# Patient Record
Sex: FEMALE | Race: OTHER | Employment: UNEMPLOYED | URBAN - METROPOLITAN AREA
[De-identification: names, ages, dates, MRNs, and addresses within clinical notes are randomized per-mention and may not be internally consistent; named-entity substitution may affect disease eponyms.]

---

## 2024-07-23 ENCOUNTER — OFFICE VISIT (OUTPATIENT)
Dept: OBGYN CLINIC | Facility: CLINIC | Age: 29
End: 2024-07-23

## 2024-07-23 ENCOUNTER — APPOINTMENT (OUTPATIENT)
Dept: LAB | Facility: CLINIC | Age: 29
End: 2024-07-23

## 2024-07-23 VITALS
DIASTOLIC BLOOD PRESSURE: 56 MMHG | SYSTOLIC BLOOD PRESSURE: 108 MMHG | BODY MASS INDEX: 24.27 KG/M2 | HEIGHT: 66 IN | WEIGHT: 151 LBS

## 2024-07-23 DIAGNOSIS — O36.80X0 PREGNANCY OF UNKNOWN ANATOMIC LOCATION: ICD-10-CM

## 2024-07-23 DIAGNOSIS — O20.9 BLEEDING IN EARLY PREGNANCY: ICD-10-CM

## 2024-07-23 DIAGNOSIS — N92.6 MISSED MENSES: ICD-10-CM

## 2024-07-23 DIAGNOSIS — R11.0 NAUSEA: ICD-10-CM

## 2024-07-23 DIAGNOSIS — O20.9 BLEEDING IN EARLY PREGNANCY: Primary | ICD-10-CM

## 2024-07-23 PROBLEM — E83.51 HYPOCALCEMIA: Status: ACTIVE | Noted: 2024-07-23

## 2024-07-23 PROBLEM — E55.9 VITAMIN D DEFICIENCY: Status: ACTIVE | Noted: 2024-07-23

## 2024-07-23 PROBLEM — D50.9 IRON DEFICIENCY ANEMIA: Status: ACTIVE | Noted: 2024-07-23

## 2024-07-23 PROBLEM — E28.2 PCOS (POLYCYSTIC OVARIAN SYNDROME): Status: ACTIVE | Noted: 2024-07-23

## 2024-07-23 LAB
ABO GROUP BLD: NORMAL
B-HCG SERPL-ACNC: ABNORMAL MIU/ML (ref 0–5)
BLD GP AB SCN SERPL QL: NEGATIVE
RH BLD: POSITIVE
SL AMB POCT URINE HCG: POSITIVE
SPECIMEN EXPIRATION DATE: NORMAL

## 2024-07-23 PROCEDURE — 86901 BLOOD TYPING SEROLOGIC RH(D): CPT

## 2024-07-23 PROCEDURE — 84702 CHORIONIC GONADOTROPIN TEST: CPT

## 2024-07-23 PROCEDURE — 86900 BLOOD TYPING SEROLOGIC ABO: CPT

## 2024-07-23 PROCEDURE — 99204 OFFICE O/P NEW MOD 45 MIN: CPT | Performed by: OBSTETRICS & GYNECOLOGY

## 2024-07-23 PROCEDURE — 36415 COLL VENOUS BLD VENIPUNCTURE: CPT

## 2024-07-23 PROCEDURE — 86850 RBC ANTIBODY SCREEN: CPT

## 2024-07-23 PROCEDURE — 81025 URINE PREGNANCY TEST: CPT | Performed by: OBSTETRICS & GYNECOLOGY

## 2024-07-23 NOTE — PROGRESS NOTES
Patient is a 29 y.o.  with Patient's last menstrual period was 2024 (exact date). who presents requesting evaluation of bleeding and pain in early pregnancy.   Pt reports history of PCOS, but her cycles are regular on Metformin. She reports she has been  a year, but only recently moved to the . She cycles every 28-32 days and has been open to pregnancy. She missed her menses in July and took a pregnancy test at home on  that was positive.   For the last 2 weeks she reports significant pelvic cramping. The first 2 days it was all day, and now it is only in the morning and then it eases up as the day progresses. She does report that she had brown fluid last week associated with the cramping. That has since resolved. She self reports a blood type of A+  Lastly, she reports nausea and dizziness in the morning for the last two days. She does not vomit and the symptoms resolve spontaneously.  She is not taking any medications other than vitamin D and Metformin.  Past Medical History:   Diagnosis Date    Chickenpox     Oral herpes     Varicella vaccination        Past Surgical History:   Procedure Laterality Date    NASAL RECONSTRUCTION      cosmetic       OB History    Para Term  AB Living   1 0 0 0 0 0   SAB IAB Ectopic Multiple Live Births   0 0 0 0 0      # Outcome Date GA Lbr Dante/2nd Weight Sex Type Anes PTL Lv   1 Current               Obstetric Comments   Menarche: [age]      Menses: 28-32/5-7/maxi pad  every 6 hours x 2 days, then regular pad every 8-12 hours         Current Outpatient Medications:     Cholecalciferol (VITAMIN D3) 1,000 units tablet, Take 1,000 Units by mouth daily, Disp: , Rfl:     metFORMIN (GLUCOPHAGE) 1000 MG tablet, Take 1,000 mg by mouth daily with breakfast, Disp: , Rfl:     No Known Allergies    Social History     Socioeconomic History    Marital status: /Civil Union     Spouse name: Alfredo    Number of children: 0    Years of education: None     Highest education level: Bachelor's degree (e.g., BA, AB, BS)   Occupational History    Occupation:    Tobacco Use    Smoking status: Former    Smokeless tobacco: Never    Tobacco comments:     Hookah   Vaping Use    Vaping status: Former   Substance and Sexual Activity    Alcohol use: Not Currently     Comment: stopped with + UPT    Drug use: Never    Sexual activity: Yes     Partners: Male     Birth control/protection: None     Comment: lifetime partners: 1   Other Topics Concern    None   Social History Narrative    Zoroastrian: [place] [employer]    Accepts blood products     Social Determinants of Health     Financial Resource Strain: Not on file   Food Insecurity: Not on file   Transportation Needs: Not on file   Physical Activity: Not on file   Stress: Not on file   Social Connections: Not on file   Intimate Partner Violence: Not on file   Housing Stability: Not on file       Family History   Problem Relation Age of Onset    Hypertension Mother     Hypertension Father     Lung cancer Father     No Known Problems Sister     No Known Problems Brother     No Known Problems Brother     No Known Problems Brother     Diabetes type II Maternal Grandmother     No Known Problems Maternal Grandfather     Hypertension Paternal Grandmother     Heart attack Paternal Grandmother     Hypertension Paternal Grandfather     Heart attack Paternal Grandfather     Breast cancer Neg Hx     Ovarian cancer Neg Hx     Colon cancer Neg Hx        Review of Systems   Constitutional:  Negative for chills, fatigue, fever and unexpected weight change.   HENT:  Negative for congestion, mouth sores and sore throat.    Respiratory:  Negative for cough, chest tightness, shortness of breath and wheezing.    Cardiovascular:  Negative for chest pain and palpitations.   Gastrointestinal:  Positive for nausea. Negative for abdominal distention, abdominal pain, constipation, diarrhea and vomiting.   Endocrine: Negative for cold intolerance and  "heat intolerance.   Genitourinary:  Positive for pelvic pain and vaginal bleeding. Negative for dyspareunia, dysuria, genital sores, menstrual problem, vaginal discharge and vaginal pain.   Musculoskeletal:  Negative for arthralgias.   Skin:  Negative for color change and rash.   Neurological:  Negative for dizziness, light-headedness and headaches.   Hematological:  Negative for adenopathy.       Blood pressure 108/56, height 5' 6\" (1.676 m), weight 68.5 kg (151 lb), last menstrual period 06/08/2024. and Body mass index is 24.37 kg/m².    Physical Exam  Constitutional:       General: She is not in acute distress.     Appearance: Normal appearance. She is well-developed and normal weight. She is not ill-appearing.   HENT:      Head: Normocephalic and atraumatic.   Eyes:      Extraocular Movements: Extraocular movements intact.      Conjunctiva/sclera: Conjunctivae normal.   Neck:      Thyroid: No thyromegaly.      Trachea: No tracheal deviation.   Cardiovascular:      Rate and Rhythm: Normal rate and regular rhythm.      Heart sounds: Normal heart sounds.   Pulmonary:      Effort: Pulmonary effort is normal. No respiratory distress.      Breath sounds: Normal breath sounds. No stridor. No wheezing or rales.   Abdominal:      General: Abdomen is flat. Bowel sounds are normal. There is no distension.      Palpations: Abdomen is soft. There is no mass.      Tenderness: There is no abdominal tenderness. There is no guarding or rebound.      Hernia: No hernia is present.   Musculoskeletal:         General: No tenderness. Normal range of motion.      Cervical back: Normal range of motion and neck supple.   Lymphadenopathy:      Cervical: No cervical adenopathy.   Skin:     General: Skin is warm.      Findings: No erythema or rash.   Neurological:      Mental Status: She is alert and oriented to person, place, and time.   Psychiatric:         Mood and Affect: Mood normal.         Behavior: Behavior normal.         Thought " Content: Thought content normal.         Judgment: Judgment normal.          vulva: normal external genitalia for age and no lesions, masses, epithelial changes, or exudate  vagina: color pink, rugae  well formed rugae, and no bleeding noted  cervix: nullip and no lesions   uterus: NSSC, AF, NT, mobile  adnexa: no masses or tenderness      A/P:  Pt is a 29 y.o.  with      Yvette was seen today for new patient visit.    Diagnoses and all orders for this visit:    Bleeding in early pregnancy  -     hCG, quantitative; Future  -     Type and screen; Future  -     hCG, quantitative; Future    Pregnancy of unknown anatomic location  -     hCG, quantitative; Future  -     Type and screen; Future  -     hCG, quantitative; Future  -based on HCG trend will schedule dating u/s or r/o ectopic if needed    Nausea  -advised may try vamsi and vitamin b6 for relief    Missed menses  -     POCT urine HCG      Will contact patient with results  For now plan dating u/s and will schedule prior to leaving today

## 2024-07-25 ENCOUNTER — APPOINTMENT (OUTPATIENT)
Dept: LAB | Age: 29
End: 2024-07-25

## 2024-07-25 DIAGNOSIS — O20.9 HEMORRHAGE BEFORE 22 WEEKS GESTATION: ICD-10-CM

## 2024-07-25 DIAGNOSIS — Z87.59 ULTRASOUND SCAN TO CONFIRM FETAL VIABILITY WITH HISTORY OF MISCARRIAGE: ICD-10-CM

## 2024-07-25 DIAGNOSIS — O36.80X0 ULTRASOUND SCAN TO CONFIRM FETAL VIABILITY WITH HISTORY OF MISCARRIAGE: ICD-10-CM

## 2024-07-25 LAB — B-HCG SERPL-ACNC: ABNORMAL MIU/ML (ref 0–5)

## 2024-07-25 PROCEDURE — 84702 CHORIONIC GONADOTROPIN TEST: CPT

## 2024-07-25 PROCEDURE — 36415 COLL VENOUS BLD VENIPUNCTURE: CPT

## 2024-07-26 DIAGNOSIS — O36.80X0 PREGNANCY OF UNKNOWN ANATOMIC LOCATION: ICD-10-CM

## 2024-07-26 DIAGNOSIS — O20.9 BLEEDING IN EARLY PREGNANCY: Primary | ICD-10-CM

## 2024-08-01 ENCOUNTER — APPOINTMENT (OUTPATIENT)
Dept: LAB | Age: 29
End: 2024-08-01
Payer: COMMERCIAL

## 2024-08-01 DIAGNOSIS — O20.9 BLEEDING IN EARLY PREGNANCY: ICD-10-CM

## 2024-08-01 DIAGNOSIS — O36.80X0 PREGNANCY OF UNKNOWN ANATOMIC LOCATION: ICD-10-CM

## 2024-08-01 LAB — B-HCG SERPL-ACNC: ABNORMAL MIU/ML (ref 0–5)

## 2024-08-01 PROCEDURE — 84702 CHORIONIC GONADOTROPIN TEST: CPT

## 2024-08-01 PROCEDURE — 36415 COLL VENOUS BLD VENIPUNCTURE: CPT

## 2024-08-07 ENCOUNTER — ULTRASOUND (OUTPATIENT)
Dept: OBGYN CLINIC | Facility: CLINIC | Age: 29
End: 2024-08-07
Payer: COMMERCIAL

## 2024-08-07 VITALS
BODY MASS INDEX: 24.98 KG/M2 | SYSTOLIC BLOOD PRESSURE: 110 MMHG | DIASTOLIC BLOOD PRESSURE: 62 MMHG | WEIGHT: 155.4 LBS | HEIGHT: 66 IN

## 2024-08-07 DIAGNOSIS — Z36.9 ANTENATAL SCREENING ENCOUNTER: ICD-10-CM

## 2024-08-07 DIAGNOSIS — O36.80X0 ENCOUNTER TO DETERMINE FETAL VIABILITY OF PREGNANCY, SINGLE OR UNSPECIFIED FETUS: Primary | ICD-10-CM

## 2024-08-07 PROCEDURE — 76817 TRANSVAGINAL US OBSTETRIC: CPT | Performed by: OBSTETRICS & GYNECOLOGY

## 2024-08-07 NOTE — PROGRESS NOTES
Procedures    Serial transvaginal images were obtained through the gravid maternal uterus. The patient's LMP was 6/8/2024 with an SHWETHA of  3/15/2025 and a gestational age of 8w4d (based on LMP).   The indication for today's examination is to confirm fetal size and viability.    CRL=  2.40cm    9w1d    SHWETHA of 3/11/2025   YS=  3.3mm  FHR=  175bpm    The uterus and bilateral ovaries appear within normal limits. Left ovary is only partially seen due to patient discomfort during scan.     Uterus= 13.27 x 6.80 x 10.04cm  Rt Ovary= 4.11 x 2.22 x 2.60cm  Lt. Ovary= 3.29 x 2.04 x 2.06cm    Impression: Single, viable IUP.     Kyara Scherer RDMS    GE Z1No-RT transvaginal transducer Serial # [id number] was used to perform the examination today and subsequently followed with high level disinfection utilizing Trophon EPR procedure.    Ultrasound performed at:     [facility] [address]  Phone:  [phone]  Fax:  [phone]

## 2024-08-08 ENCOUNTER — TELEPHONE (OUTPATIENT)
Age: 29
End: 2024-08-08

## 2024-08-13 ENCOUNTER — INITIAL PRENATAL (OUTPATIENT)
Dept: OBGYN CLINIC | Facility: CLINIC | Age: 29
End: 2024-08-13

## 2024-08-13 ENCOUNTER — TELEPHONE (OUTPATIENT)
Age: 29
End: 2024-08-13

## 2024-08-13 VITALS
BODY MASS INDEX: 25.68 KG/M2 | HEIGHT: 66 IN | WEIGHT: 159.8 LBS | DIASTOLIC BLOOD PRESSURE: 64 MMHG | SYSTOLIC BLOOD PRESSURE: 100 MMHG

## 2024-08-13 DIAGNOSIS — Z34.01 ENCOUNTER FOR SUPERVISION OF NORMAL FIRST PREGNANCY IN FIRST TRIMESTER: Primary | ICD-10-CM

## 2024-08-13 DIAGNOSIS — E28.2 PCOS (POLYCYSTIC OVARIAN SYNDROME): ICD-10-CM

## 2024-08-13 DIAGNOSIS — Z13.71 SCREENING FOR GENETIC DISEASE CARRIER STATUS: ICD-10-CM

## 2024-08-13 PROCEDURE — NOBC

## 2024-08-13 NOTE — PROGRESS NOTES
OB INTAKE INTERVIEW 2024    Tajik interpretor [id number] used for visit.     Patient is 29 y.o. who presents for OB intake at 9w3d.  She is accompanied by her spouse during this encounter.  The father of her baby (Alfredo Sanchez) is involved in the pregnancy and he is 32 years old.      Patient's last menstrual period was 2024 (exact date).  Ultrasound: Measured 9 weeks 0 days on 2024  Estimated Date of Delivery: 3/15/25 confirmed by dating ultrasound.    Signs/Symptoms of Pregnancy  Current pregnancy symptoms: frequent urination  Headaches no  Cramping no  Spotting no  PICA cravings no    Diabetes-  Body mass index is 25.79 kg/m².  If patient has 1 or more, please order early 1 hour GTT  History of GDM no  BMI >35 no  History of PCOS or current metformin use YES  History of LGA/macrosomic infant (4000g/9lbs) no    If patient has 2 or more, please order early 1 hour GTT  BMI>30 no  AMA no  First degree relative with type 2 diabetes no  History of chronic HTN, hyperlipidemia, elevated A1C no  High risk race (, , ,  or ) no    Hypertension- if you answer yes to any of the following, please order baseline preeclampsia labs (cbc, comprehensive metabolic panel, urine protein creatinine ratio, uric acid)  History of of chronic HTN no  History of gestational HTN no  History of preeclampsia, eclampsia, or HELLP syndrome no  History of diabetes no  History of lupus, autoimmune disease, kidney disease no    Thyroid- if yes order TSH with reflex T4  History of thyroid disease no    Bleeding Disorder or Hx of DVT-patient or first degree relative with history of. Order the following if not done previously.   (Factor V, antithrombin III, prothrombin gene mutation, protein C and S Ag, lupus anticoagulant, anticardiolipin, beta-2 glycoprotein)   no    OB/GYN-  History of abnormal pap smear no-patient states she has not ever had a pap smear       Date of  last pap smear Not on file  History of HPV no  History of Herpes/HSV no  History of other STI (gonorrhea, chlamydia, trich) no  History of prior  no  History of prior  no  History of  delivery prior to 36 weeks 6 days no  History of blood transfusion no  Ok for blood transfusion  YES    Substance screening-   History of tobacco use YES  Currently using tobacco no  Substance Use Screen Level:  Low risk    MRSA Screening-   Does the pt have a hx of MRSA? no    Immunizations:  Discussed Tdap vaccine:  YES  Discussed COVID Vaccine:  YES    Genetic/MFM-  Do you or your partner have a history of any of the following in yourselves or first degree relatives?  Cystic fibrosis no  Spinal muscular atrophy no  Hemoglobinopathy/Sickle Cell/Thalassemia no  Fragile X Intellectual Disability no    If yes, discuss Carrier Screening and recommend consultation with MFM/Genetic Counseling and place specific [facility] Referral for.    If no, discuss Carrier Screening being completed once in a lifetime as a standard of care lab test. Place orders for Cystic Fibrosis Gene Test (IGZ368) and Spinal Muscular Atrophy DNA ([id number]).  Patient was informed that prior authorization needs to be completed for these tests and this may take 7-10 business days.  Patient does not desire testing for Cystic Fibrosis and Spinal Muscular Atrophy.    Appointment for Nuchal Translucency Ultrasound at [facility] has not been scheduled.  Patient will call to schedule appointment.     Interview education  [facility] Pregnancy Essentials Book reviewed, discussed and attached to their AVS:  YES    Nurse/Family Partnership-patient may qualify NO; referral placed No     Prenatal lab work scripts YES    Extra labs ordered: Hgb Fractionation Cascade and 1 hour GTT    Aspirin/Preeclampsia Screen    Risk Level Risk Factor Recommendation   LOW Prior Uncomplicated full-term delivery no No Aspirin recommendation        MODERATE Nulliparity YES Recommend low-dose  aspirin if     BMI>30 no 2 or more moderate risk factors    Family History Preeclampsia (mother/sister) no     35yr old or greater no      or Low Socioeconomic no     IVF Pregnancy  no     Personal History Risks (low birth weight, prior adverse preg outcome, >10yr preg interval) no         HIGH History of Preeclampsia no Recommend low-dose aspirin if     Multifetal gestation no 1 or more high risk factors    Chronic HTN no     Type 1 or 2 Diabetes no     Renal Disease no     Autoimmune Disease  no      Contraindications to ASA therapy:  NSAID/ ASA allergy: no  Nasal polyps: no  Asthma with history of ASA induced bronchospasm: no  Relative contraindications:  History of GI bleed: no  Active peptic ulcer disease: no  Severe hepatic dysfunction: no    Patient does not meet recommendation to take ASA 162mg during this pregnancy from 12-36wks to lower her risk of preeclampsia.      Mental Health:  History of depression: no  History of anxiety: no  EPDS Screen:  Negative   Score:  0    Dental Exam:  Last dental exam within the past 6 months: Yes    The patient has a history now or in prior pregnancy notable for: No current or previous pregnancy complications.    Details that I feel the provider should be aware of: No additional concerns    PN1 visit scheduled. The patient was oriented to our practice and the navigator role.  Reviewed delivering physicians and [facility] for delivery. All questions were answered.    Interviewed by: PETER DELANEY LPN

## 2024-08-13 NOTE — PATIENT INSTRUCTIONS
Congratulations on your pregnancy!  We thank you for allowing us to participate in your care.    NEXT STEPS    Go to the lab to have your prenatal blood work competed, if you have not already done so.  We ask that you have this blood work done prior to your first routine prenatal appointment.  There is a listing of [facility] and locations in your prenatal folder. You may also visit [email or web address] or call [phone].   Please be aware that some insurance companies may require you to go to a specific lab (ex. [facility] or [facility]). You can verify this by contacting your insurance company.   If you have decided to have genetic testing done at Maternal Fetal Medicine, that will be scheduled by [facility]. You may have already scheduled this appointment.  If not, please call their office to schedule this appointment.  Based on the referral placed by our office, they will know how to schedule you appropriately.    Contact information for M[facility] Medicine is located in your prenatal folder. The main phone number to their office is [phone]..   Return to our office for your first routine prenatal visit.   Some offices have multiple locations. Always check the address of your appointment to ensure you are going to the correct office.   If you experience any problems or concerns, call the office directly.  Remember to only use mPortal for none urgent concerns or questions.  Our doctors deliver at [facility] in [place]. The address is provided below.     [place] [id number]    Click on the links below to review our Pregnancy Essential Guide.  [facility] Pregnancy Essentials Guide  [facility] ([email or web address])     Click on the link below to review [facility] locations.  [facility] Lab Locations    [employer] resource  Only-apartments is a tool to connect you to community resources you may need.    Thank you,  Keely MAYS LPN  OB Nurse  Navigator

## 2024-08-14 ENCOUNTER — TELEPHONE (OUTPATIENT)
Age: 29
End: 2024-08-14

## 2024-08-14 NOTE — TELEPHONE ENCOUNTER
Called patient 3x to schedule Nuchal, voicemail not set up. [employer] message sent. Notifying OB. Thank you.

## 2024-08-21 ENCOUNTER — APPOINTMENT (OUTPATIENT)
Dept: LAB | Facility: HOSPITAL | Age: 29
End: 2024-08-21
Payer: COMMERCIAL

## 2024-08-21 DIAGNOSIS — Z34.01 ENCOUNTER FOR SUPERVISION OF NORMAL FIRST PREGNANCY IN FIRST TRIMESTER: ICD-10-CM

## 2024-08-21 DIAGNOSIS — E28.2 PCOS (POLYCYSTIC OVARIAN SYNDROME): ICD-10-CM

## 2024-08-21 LAB
ABO GROUP BLD: NORMAL
BASOPHILS # BLD AUTO: 0.02 THOUSANDS/ÂΜL (ref 0–0.1)
BASOPHILS NFR BLD AUTO: 0 % (ref 0–1)
BLD GP AB SCN SERPL QL: NEGATIVE
EOSINOPHIL # BLD AUTO: 0.06 THOUSAND/ÂΜL (ref 0–0.61)
EOSINOPHIL NFR BLD AUTO: 1 % (ref 0–6)
ERYTHROCYTE [DISTWIDTH] IN BLOOD BY AUTOMATED COUNT: 13.8 % (ref 11.6–15.1)
GLUCOSE 1H P 50 G GLC PO SERPL-MCNC: 103 MG/DL (ref 70–134)
HCT VFR BLD AUTO: 33.3 % (ref 34.8–46.1)
HGB BLD-MCNC: 10.9 G/DL (ref 11.5–15.4)
IMM GRANULOCYTES # BLD AUTO: 0.04 THOUSAND/UL (ref 0–0.2)
IMM GRANULOCYTES NFR BLD AUTO: 1 % (ref 0–2)
LYMPHOCYTES # BLD AUTO: 1.31 THOUSANDS/ÂΜL (ref 0.6–4.47)
LYMPHOCYTES NFR BLD AUTO: 18 % (ref 14–44)
MCH RBC QN AUTO: 28.2 PG (ref 26.8–34.3)
MCHC RBC AUTO-ENTMCNC: 32.7 G/DL (ref 31.4–37.4)
MCV RBC AUTO: 86 FL (ref 82–98)
MONOCYTES # BLD AUTO: 0.46 THOUSAND/ÂΜL (ref 0.17–1.22)
MONOCYTES NFR BLD AUTO: 6 % (ref 4–12)
NEUTROPHILS # BLD AUTO: 5.45 THOUSANDS/ÂΜL (ref 1.85–7.62)
NEUTS SEG NFR BLD AUTO: 74 % (ref 43–75)
NRBC BLD AUTO-RTO: 0 /100 WBCS
PLATELET # BLD AUTO: 248 THOUSANDS/UL (ref 149–390)
PMV BLD AUTO: 12.1 FL (ref 8.9–12.7)
RBC # BLD AUTO: 3.87 MILLION/UL (ref 3.81–5.12)
RH BLD: POSITIVE
RUBV IGG SERPL IA-ACNC: 23.7 IU/ML
SPECIMEN EXPIRATION DATE: NORMAL
WBC # BLD AUTO: 7.34 THOUSAND/UL (ref 4.31–10.16)

## 2024-08-21 PROCEDURE — 86780 TREPONEMA PALLIDUM: CPT

## 2024-08-21 PROCEDURE — 82950 GLUCOSE TEST: CPT

## 2024-08-21 PROCEDURE — 83020 HEMOGLOBIN ELECTROPHORESIS: CPT

## 2024-08-21 PROCEDURE — 85025 COMPLETE CBC W/AUTO DIFF WBC: CPT

## 2024-08-21 PROCEDURE — 86850 RBC ANTIBODY SCREEN: CPT

## 2024-08-21 PROCEDURE — 86706 HEP B SURFACE ANTIBODY: CPT

## 2024-08-21 PROCEDURE — 87389 HIV-1 AG W/HIV-1&-2 AB AG IA: CPT

## 2024-08-21 PROCEDURE — 36415 COLL VENOUS BLD VENIPUNCTURE: CPT

## 2024-08-21 PROCEDURE — 86900 BLOOD TYPING SEROLOGIC ABO: CPT

## 2024-08-21 PROCEDURE — 86803 HEPATITIS C AB TEST: CPT

## 2024-08-21 PROCEDURE — 87340 HEPATITIS B SURFACE AG IA: CPT

## 2024-08-21 PROCEDURE — 86762 RUBELLA ANTIBODY: CPT

## 2024-08-21 PROCEDURE — 86901 BLOOD TYPING SEROLOGIC RH(D): CPT

## 2024-08-22 ENCOUNTER — APPOINTMENT (OUTPATIENT)
Dept: LAB | Age: 29
End: 2024-08-22
Payer: COMMERCIAL

## 2024-08-22 LAB
AMORPH URATE CRY URNS QL MICRO: ABNORMAL
BACTERIA UR QL AUTO: ABNORMAL /HPF
BILIRUB UR QL STRIP: NEGATIVE
CLARITY UR: ABNORMAL
COLOR UR: YELLOW
GLUCOSE UR STRIP-MCNC: NEGATIVE MG/DL
HBV SURFACE AB SER-ACNC: <3 MIU/ML
HBV SURFACE AG SER QL: NORMAL
HCV AB SER QL: NORMAL
HGB UR QL STRIP.AUTO: NEGATIVE
HIV 1+2 AB+HIV1 P24 AG SERPL QL IA: NORMAL
HIV 2 AB SERPL QL IA: NORMAL
HIV1 AB SERPL QL IA: NORMAL
HIV1 P24 AG SERPL QL IA: NORMAL
KETONES UR STRIP-MCNC: NEGATIVE MG/DL
LEUKOCYTE ESTERASE UR QL STRIP: NEGATIVE
MUCOUS THREADS UR QL AUTO: ABNORMAL
NITRITE UR QL STRIP: NEGATIVE
NON-SQ EPI CELLS URNS QL MICRO: ABNORMAL /HPF
PH UR STRIP.AUTO: 7 [PH]
PROT UR STRIP-MCNC: ABNORMAL MG/DL
RBC #/AREA URNS AUTO: ABNORMAL /HPF
SP GR UR STRIP.AUTO: 1.02 (ref 1–1.03)
TREPONEMA PALLIDUM IGG+IGM AB [PRESENCE] IN SERUM OR PLASMA BY IMMUNOASSAY: NORMAL
UROBILINOGEN UR STRIP-ACNC: <2 MG/DL
WBC #/AREA URNS AUTO: ABNORMAL /HPF

## 2024-08-22 PROCEDURE — 87186 SC STD MICRODIL/AGAR DIL: CPT

## 2024-08-22 PROCEDURE — 87077 CULTURE AEROBIC IDENTIFY: CPT

## 2024-08-22 PROCEDURE — 81001 URINALYSIS AUTO W/SCOPE: CPT

## 2024-08-22 PROCEDURE — 87086 URINE CULTURE/COLONY COUNT: CPT

## 2024-08-24 LAB
BACTERIA UR CULT: ABNORMAL
HGB A MFR BLD: 2.9 % (ref 1.8–3.2)
HGB A MFR BLD: 97.1 % (ref 96.4–98.8)
HGB F MFR BLD: 0 % (ref 0–2)
HGB FRACT BLD-IMP: NORMAL
HGB S MFR BLD: 0 %

## 2024-08-27 PROBLEM — Z01.84 LACK OF IMMUNITY TO HEPATITIS B VIRUS DEMONSTRATED BY SEROLOGIC TEST: Status: ACTIVE | Noted: 2024-08-27

## 2024-08-27 PROBLEM — O99.011 ANEMIA, ANTEPARTUM, FIRST TRIMESTER: Status: ACTIVE | Noted: 2024-08-27

## 2024-08-30 ENCOUNTER — INITIAL PRENATAL (OUTPATIENT)
Dept: OBGYN CLINIC | Facility: CLINIC | Age: 29
End: 2024-08-30

## 2024-08-30 VITALS
HEIGHT: 66 IN | HEART RATE: 81 BPM | DIASTOLIC BLOOD PRESSURE: 68 MMHG | OXYGEN SATURATION: 99 % | BODY MASS INDEX: 25.33 KG/M2 | WEIGHT: 157.6 LBS | SYSTOLIC BLOOD PRESSURE: 110 MMHG

## 2024-08-30 DIAGNOSIS — O09.91 PREGNANCY, HIGH-RISK, FIRST TRIMESTER: Primary | ICD-10-CM

## 2024-08-30 DIAGNOSIS — O99.011 ANEMIA, ANTEPARTUM, FIRST TRIMESTER: ICD-10-CM

## 2024-08-30 DIAGNOSIS — Z3A.11 11 WEEKS GESTATION OF PREGNANCY: ICD-10-CM

## 2024-08-30 PROCEDURE — 87591 N.GONORRHOEAE DNA AMP PROB: CPT | Performed by: OBSTETRICS & GYNECOLOGY

## 2024-08-30 PROCEDURE — G0145 SCR C/V CYTO,THINLAYER,RESCR: HCPCS | Performed by: OBSTETRICS & GYNECOLOGY

## 2024-08-30 PROCEDURE — PNV: Performed by: OBSTETRICS & GYNECOLOGY

## 2024-08-30 PROCEDURE — 87491 CHLMYD TRACH DNA AMP PROBE: CPT | Performed by: OBSTETRICS & GYNECOLOGY

## 2024-08-30 NOTE — PROGRESS NOTES
"Skysheet  #[id number] used for this encounter  Assessment & Plan  29 y.o.  at 11w6d presenting for routine prenatal visit.     Problem List       PCOS (polycystic ovarian syndrome)    Iron deficiency anemia    Vitamin D deficiency    Hypocalcemia    Anemia, antepartum, first trimester    Overview     Hgb 10.9 on initial prenatal labs         Lack of immunity to hepatitis B virus demonstrated by serologic test    11 weeks gestation of pregnancy    Overview     Hep B NI  Pap/GC [ ]  MSAFP [ ]  Flu [ ]  Tdap [ ]  Delivery consent [ ]  GBS [ ]  Contraception [ ]          Other Visit Diagnoses       Pregnancy, high-risk, first trimester    -  Primary          ____________________________________________________________  Subjective  She is without physical complaint.   She denies contractions, loss of fluid, or vaginal bleeding.   She reports feeling anxiety about this pregnancy; denies any anxiety medications prior to pregnancy; discussed possibility of using Atarax at night if needed, as well as [facility].    Objective  /68 (BP Location: Right arm, Patient Position: Sitting, Cuff Size: Standard)   Pulse 81   Ht 5' 6\" (1.676 m)   Wt 71.5 kg (157 lb 9.6 oz)   LMP 2024 (Exact Date)   SpO2 99%   BMI 25.44 kg/m²   FHR: 169 bpm    Physical Exam  Constitutional:       Appearance: She is well-developed.   Genitourinary:      Vulva normal.      Genitourinary Comments: Patient poorly tolerant of speculum exam      No vaginal discharge.   Cardiovascular:      Rate and Rhythm: Normal rate.   Pulmonary:      Effort: Pulmonary effort is normal. No respiratory distress.   Abdominal:      Palpations: Abdomen is soft.      Tenderness: There is no abdominal tenderness.   Skin:     General: Skin is warm and dry.          Patient's Active Problem List  Patient Active Problem List   Diagnosis    PCOS (polycystic ovarian syndrome)    Iron deficiency anemia    Vitamin D deficiency    Hypocalcemia    " Anemia, antepartum, first trimester    Lack of immunity to hepatitis B virus demonstrated by serologic test    11 weeks gestation of pregnancy           Lakesha Betancourt MD  8/30/2024  8:16 AM

## 2024-09-03 LAB
C TRACH DNA SPEC QL NAA+PROBE: NEGATIVE
N GONORRHOEA DNA SPEC QL NAA+PROBE: NEGATIVE

## 2024-09-06 LAB
LAB AP GYN PRIMARY INTERPRETATION: NORMAL
LAB AP LMP: NORMAL
Lab: NORMAL

## 2024-09-10 ENCOUNTER — ROUTINE PRENATAL (OUTPATIENT)
Dept: PERINATAL CARE | Facility: CLINIC | Age: 29
End: 2024-09-10
Payer: COMMERCIAL

## 2024-09-10 VITALS
SYSTOLIC BLOOD PRESSURE: 108 MMHG | HEIGHT: 66 IN | BODY MASS INDEX: 25.13 KG/M2 | DIASTOLIC BLOOD PRESSURE: 64 MMHG | HEART RATE: 98 BPM | WEIGHT: 156.4 LBS

## 2024-09-10 DIAGNOSIS — Z3A.13 13 WEEKS GESTATION OF PREGNANCY: Primary | ICD-10-CM

## 2024-09-10 DIAGNOSIS — E55.9 VITAMIN D DEFICIENCY: ICD-10-CM

## 2024-09-10 DIAGNOSIS — Z36.9 ANTENATAL SCREENING ENCOUNTER: ICD-10-CM

## 2024-09-10 DIAGNOSIS — E28.2 PCOS (POLYCYSTIC OVARIAN SYNDROME): ICD-10-CM

## 2024-09-10 DIAGNOSIS — Z36.82 ENCOUNTER FOR (NT) NUCHAL TRANSLUCENCY SCAN: ICD-10-CM

## 2024-09-10 PROCEDURE — 76813 OB US NUCHAL MEAS 1 GEST: CPT | Performed by: OBSTETRICS & GYNECOLOGY

## 2024-09-10 PROCEDURE — 99243 OFF/OP CNSLTJ NEW/EST LOW 30: CPT | Performed by: OBSTETRICS & GYNECOLOGY

## 2024-09-10 NOTE — LETTER
"September 10, 2024     Cherelle Guaman MD  [address]  [address]  [place] [address]    Patient: Yvette Mcdonald   YOB: 1995   Date of Visit: 9/10/2024       Dear Dr. Guaman:    Thank you for referring Yvette Mcdonald to me for evaluation. Below are my notes for this consultation.    If you have questions, please do not hesitate to call me. I look forward to following your patient along with you.         Sincerely,        Vicki Moore MD        CC: No Recipients    Vicki Moore MD  9/10/2024  3:44 PM  Sign when Signing Visit  [facility]: Ms. Mcdonald was seen today at 13w3d for nuchal translucency ultrasound.  See ultrasound report under \"OB Procedures\" tab.      My recommendations are as follows:  We reviewed the availability of aneuploidy screening, as well as diagnostic testing, which are available to all pregnant women. We reviewed limitations, risks, and benefits of screening and testing.  She does not wish to pursue aneuploidy screening or diagnostic testing at this time. MSAFP screening should be ordered through your office at 15-20 weeks gestation, and completed prior to fetal anatomic survey.  A detailed anatomic survey as well as transvaginal cervical length screening are recommended around 20  weeks gestation.  Please offer carrier screening per ACOG guidelines.     Yvette inquired about whether to resume Vitamin D supplementation. I offered to check her Vitamin D levels to guide this decision-making. I did advise her that if she is found to have a deficiency I would recommend that she follow-up with her primary care provider for management.     Please don't hesitate to contact our office with any concerns or questions.    -Vicki Moore MD  "

## 2024-09-10 NOTE — PROGRESS NOTES
"[facility]: Ms. Mcdonald was seen today at 13w3d for nuchal translucency ultrasound.  See ultrasound report under \"OB Procedures\" tab.      My recommendations are as follows:  We reviewed the availability of aneuploidy screening, as well as diagnostic testing, which are available to all pregnant women. We reviewed limitations, risks, and benefits of screening and testing.  She does not wish to pursue aneuploidy screening or diagnostic testing at this time. MSAFP screening should be ordered through your office at 15-20 weeks gestation, and completed prior to fetal anatomic survey.  A detailed anatomic survey as well as transvaginal cervical length screening are recommended around 20  weeks gestation.  Please offer carrier screening per ACOG guidelines.     Yvette inquired about whether to resume Vitamin D supplementation. I offered to check her Vitamin D levels to guide this decision-making. I did advise her that if she is found to have a deficiency I would recommend that she follow-up with her primary care provider for management.     Please don't hesitate to contact our office with any concerns or questions.    -Vicki Moore MD  "

## 2024-09-18 ENCOUNTER — LAB (OUTPATIENT)
Dept: LAB | Age: 29
End: 2024-09-18
Payer: COMMERCIAL

## 2024-09-18 DIAGNOSIS — E55.9 VITAMIN D DEFICIENCY: ICD-10-CM

## 2024-09-18 LAB — 25(OH)D3 SERPL-MCNC: 15.8 NG/ML (ref 30–100)

## 2024-09-18 PROCEDURE — 36415 COLL VENOUS BLD VENIPUNCTURE: CPT

## 2024-09-18 PROCEDURE — 82652 VIT D 1 25-DIHYDROXY: CPT

## 2024-09-18 PROCEDURE — 82306 VITAMIN D 25 HYDROXY: CPT

## 2024-09-20 LAB — 1,25(OH)2D SERPL-MCNC: 102 PG/ML (ref 5–200)

## 2024-09-23 NOTE — RESULT ENCOUNTER NOTE
Advised patient of low Vit D level and that she can resume supplement and follow-up with PCP regarding this result (as previously discussed).

## 2024-10-01 ENCOUNTER — ROUTINE PRENATAL (OUTPATIENT)
Dept: OBGYN CLINIC | Facility: CLINIC | Age: 29
End: 2024-10-01

## 2024-10-01 VITALS
HEART RATE: 88 BPM | OXYGEN SATURATION: 98 % | SYSTOLIC BLOOD PRESSURE: 100 MMHG | WEIGHT: 158.4 LBS | DIASTOLIC BLOOD PRESSURE: 58 MMHG | BODY MASS INDEX: 25.46 KG/M2 | HEIGHT: 66 IN

## 2024-10-01 DIAGNOSIS — Z3A.16 16 WEEKS GESTATION OF PREGNANCY: ICD-10-CM

## 2024-10-01 DIAGNOSIS — E55.9 VITAMIN D DEFICIENCY: ICD-10-CM

## 2024-10-01 DIAGNOSIS — O99.012 ANEMIA OF PREGNANCY IN SECOND TRIMESTER: Primary | ICD-10-CM

## 2024-10-01 PROCEDURE — PNV: Performed by: OBSTETRICS & GYNECOLOGY

## 2024-10-01 NOTE — PROGRESS NOTES
"Assessment & Plan  29 y.o.  at 16w3d presenting for routine prenatal visit.       Problem List       PCOS (polycystic ovarian syndrome)    Overview     Stopped metformin         Iron deficiency anemia    Vitamin D deficiency    Hypocalcemia    Anemia of pregnancy in second trimester    Overview     Hgb 10.9 on initial prenatal labs         Lack of immunity to hepatitis B virus demonstrated by serologic test    16 weeks gestation of pregnancy    Overview     Hep B NI  Pap/GC-declines to have this done in pregnancy[X]  MSAFP ordered  Quad screen ordered  Flu [ ]  Tdap [ ]  Delivery consent [ ]  GBS [ ]  Contraception [ ]          ____________________________________________________________  Subjective  She is without complaint.   She denies contractions, loss of fluid, or vaginal bleeding.       Objective  /58 (BP Location: Right arm, Patient Position: Sitting, Cuff Size: Adult)   Pulse 88   Ht 5' 6\" (1.676 m)   Wt 71.8 kg (158 lb 6.4 oz)   LMP 2024 (Exact Date)   SpO2 98%   BMI 25.57 kg/m²   FHR: 150's via doppler    Physical Exam  Constitutional:       Appearance: She is well-developed.   Cardiovascular:      Rate and Rhythm: Normal rate and regular rhythm.      Heart sounds: Normal heart sounds. No murmur heard.     No friction rub. No gallop.   Pulmonary:      Effort: Pulmonary effort is normal. No respiratory distress.      Breath sounds: No wheezing.   Abdominal:      Palpations: Abdomen is soft.      Tenderness: There is no abdominal tenderness.   Musculoskeletal:         General: No tenderness.   Neurological:      Mental Status: She is alert and oriented to person, place, and time.   Vitals reviewed.          Patient's Active Problem List  Patient Active Problem List   Diagnosis    PCOS (polycystic ovarian syndrome)    Iron deficiency anemia    Vitamin D deficiency    Hypocalcemia    Anemia of pregnancy in second trimester    Lack of immunity to hepatitis B virus demonstrated by " serologic test    16 weeks gestation of pregnancy           Esthela Khoury MD  10/1/2024  11:32 AM

## 2024-10-02 ENCOUNTER — APPOINTMENT (OUTPATIENT)
Dept: LAB | Age: 29
End: 2024-10-02
Payer: COMMERCIAL

## 2024-10-02 DIAGNOSIS — Z3A.16 16 WEEKS GESTATION OF PREGNANCY: ICD-10-CM

## 2024-10-02 PROCEDURE — 82677 ASSAY OF ESTRIOL: CPT

## 2024-10-02 PROCEDURE — 86336 INHIBIN A: CPT

## 2024-10-02 PROCEDURE — 36415 COLL VENOUS BLD VENIPUNCTURE: CPT

## 2024-10-02 PROCEDURE — 82105 ALPHA-FETOPROTEIN SERUM: CPT

## 2024-10-02 PROCEDURE — 84702 CHORIONIC GONADOTROPIN TEST: CPT

## 2024-10-07 LAB
2ND TRIMESTER 4 SCREEN SERPL-IMP: NORMAL
AFP ADJ MOM SERPL: 0.9
AFP ADJ MOM SERPL: 1.05
AFP INTERP AMN-IMP: NORMAL
AFP INTERP SERPL-IMP: NORMAL
AFP INTERP SERPL-IMP: NORMAL
AFP SERPL-MCNC: 31 NG/ML
AFP SERPL-MCNC: 35.4 NG/ML
AGE AT DELIVERY: 30.2 YR
AGE AT DELIVERY: 30.2 YR
FET TS 18 RISK FROM MAT AGE: NORMAL
FET TS 21 RISK FROM MAT AGE: 682
GA METHOD: NORMAL
GA METHOD: NORMAL
GA: 15.9 WEEKS
GA: 16.6 WEEKS
HCG ADJ MOM SERPL: 0.69
HCG SERPL-ACNC: NORMAL MIU/ML
IDDM PATIENT QL: NO
IDDM PATIENT QL: NO
INHIBIN A ADJ MOM SERPL: 0.58
INHIBIN A SERPL-MCNC: 90.87 PG/ML
KARYOTYP BLD/T: NORMAL
MULTIPLE PREGNANCY: NO
MULTIPLE PREGNANCY: NO
NEURAL TUBE DEFECT RISK FETUS: NORMAL %
NEURAL TUBE DEFECT RISK FETUS: NORMAL %
SERVICE CMNT-IMP: NORMAL
TS 18 RISK FETUS: NORMAL
TS 21 RISK FETUS: 3740
U ESTRIOL ADJ MOM SERPL: 0.61
U ESTRIOL SERPL-MCNC: 0.58 NG/ML

## 2024-10-11 ENCOUNTER — TELEPHONE (OUTPATIENT)
Dept: OBGYN CLINIC | Facility: CLINIC | Age: 29
End: 2024-10-11

## 2024-10-15 NOTE — TELEPHONE ENCOUNTER
Overall how are you doing? Patient states she is doing well.    Compliant with routine OB care appointments? Yes    Have you completed your 1st trimester labs? Yes    If you had NIPS with MFM, do you have a order for MSAFP? MSAFP has been completed.   Can be completed 15w-22w9d, ideally 16w-18w    Have you seen MFM and do you have your detailed US scheduled? Yes    Pregnancy Education-have you had a chance to review the classes offered and registered? Yes, patient is interested and plans to register for classes.

## 2024-10-25 ENCOUNTER — ROUTINE PRENATAL (OUTPATIENT)
Dept: OBGYN CLINIC | Facility: CLINIC | Age: 29
End: 2024-10-25

## 2024-10-25 ENCOUNTER — ROUTINE PRENATAL (OUTPATIENT)
Dept: PERINATAL CARE | Facility: CLINIC | Age: 29
End: 2024-10-25
Payer: COMMERCIAL

## 2024-10-25 VITALS
OXYGEN SATURATION: 99 % | DIASTOLIC BLOOD PRESSURE: 56 MMHG | HEART RATE: 69 BPM | BODY MASS INDEX: 26.16 KG/M2 | HEIGHT: 66 IN | SYSTOLIC BLOOD PRESSURE: 98 MMHG | WEIGHT: 162.8 LBS

## 2024-10-25 VITALS
HEART RATE: 86 BPM | OXYGEN SATURATION: 99 % | WEIGHT: 164 LBS | BODY MASS INDEX: 26.36 KG/M2 | HEIGHT: 66 IN | DIASTOLIC BLOOD PRESSURE: 58 MMHG | SYSTOLIC BLOOD PRESSURE: 94 MMHG

## 2024-10-25 DIAGNOSIS — Z36.89 ENCOUNTER FOR FETAL ANATOMIC SURVEY: ICD-10-CM

## 2024-10-25 DIAGNOSIS — O99.012 ANEMIA OF PREGNANCY IN SECOND TRIMESTER: ICD-10-CM

## 2024-10-25 DIAGNOSIS — Z36.86 ENCOUNTER FOR ANTENATAL SCREENING FOR CERVICAL LENGTH: ICD-10-CM

## 2024-10-25 DIAGNOSIS — O09.92 ENCOUNTER FOR SUPERVISION OF HIGH RISK PREGNANCY IN SECOND TRIMESTER, ANTEPARTUM: Primary | ICD-10-CM

## 2024-10-25 DIAGNOSIS — Z3A.20 20 WEEKS GESTATION OF PREGNANCY: ICD-10-CM

## 2024-10-25 DIAGNOSIS — Z3A.19 19 WEEKS GESTATION OF PREGNANCY: Primary | ICD-10-CM

## 2024-10-25 DIAGNOSIS — O35.EXX0 PYELECTASIS OF FETUS ON PRENATAL ULTRASOUND: ICD-10-CM

## 2024-10-25 PROCEDURE — 76811 OB US DETAILED SNGL FETUS: CPT | Performed by: OBSTETRICS & GYNECOLOGY

## 2024-10-25 PROCEDURE — PNV: Performed by: OBSTETRICS & GYNECOLOGY

## 2024-10-25 PROCEDURE — 76817 TRANSVAGINAL US OBSTETRIC: CPT | Performed by: OBSTETRICS & GYNECOLOGY

## 2024-10-25 PROCEDURE — 99213 OFFICE O/P EST LOW 20 MIN: CPT | Performed by: OBSTETRICS & GYNECOLOGY

## 2024-10-25 NOTE — PROGRESS NOTES
"[employer] #[id number] used for this encounter    Assessment & Plan  29 y.o.  at 19w6d presenting for routine prenatal visit.     Problem List       PCOS (polycystic ovarian syndrome)    Overview     Stopped metformin         Iron deficiency anemia    Vitamin D deficiency    Hypocalcemia    Anemia of pregnancy in second trimester    Overview     Hgb 10.9 on initial prenatal labs         Lack of immunity to hepatitis B virus demonstrated by serologic test    20 weeks gestation of pregnancy    Overview     Hep B NI  MSAFP negative  Quad screen ordered  Flu [ ]  Tdap [ ]  Delivery consent [ ]  GBS [ ]  Contraception [ ]          Other Visit Diagnoses       Encounter for supervision of high risk pregnancy in second trimester, antepartum    -  Primary          ____________________________________________________________  Subjective  She is without complaint.   She denies contractions, loss of fluid, or vaginal bleeding.   She has felt some fetal movement    Objective  BP 94/58 (BP Location: Right arm, Patient Position: Sitting, Cuff Size: Standard)   Pulse 86   Ht 5' 5.5\" (1.664 m)   Wt 74.4 kg (164 lb)   LMP 2024 (Exact Date)   SpO2 99%   BMI 26.88 kg/m²   FHR: 140 bpm    Physical Exam  Constitutional:       Appearance: She is well-developed.   Cardiovascular:      Rate and Rhythm: Normal rate.   Pulmonary:      Effort: Pulmonary effort is normal. No respiratory distress.   Abdominal:      Palpations: Abdomen is soft.      Tenderness: There is no abdominal tenderness.   Skin:     General: Skin is warm and dry.          Patient's Active Problem List  Patient Active Problem List   Diagnosis    PCOS (polycystic ovarian syndrome)    Iron deficiency anemia    Vitamin D deficiency    Hypocalcemia    Anemia of pregnancy in second trimester    Lack of immunity to hepatitis B virus demonstrated by serologic test    20 weeks gestation of pregnancy           Lakesha Betancourt MD  10/25/2024  10:19 AM          "

## 2024-10-25 NOTE — LETTER
"   Date: 10/25/2024    Esthela Khoury MD  [address] [place] [address]  [address]  [facility] [id number]    Patient: Yvette Mcdonald   YOB: 1995   Date of Visit: 10/25/2024   Gestational age 20w1d   Nature of this communication: Routine       This patient was seen recently in our  office.  Please see ultrasound report under \"OB Procedures\" tab.  Please don't hesitate to contact our office with any concerns or questions.      Sincerely,      Vicki Moore MD  Attending Physician, Maternal-Fetal Medicine  [facility]    "

## 2024-10-25 NOTE — PROGRESS NOTES
"[facility]: Yvette Mcdonald was seen today at 19w6d for anatomic survey and cervical length screening ultrasound.  See ultrasound report under \"OB Procedures\" tab.  Please don't hesitate to contact our office with any concerns or questions.  -Vicki Moore MD    "

## 2024-10-27 PROBLEM — O35.EXX0 PYELECTASIS OF FETUS ON PRENATAL ULTRASOUND: Status: ACTIVE | Noted: 2024-10-27

## 2024-11-26 ENCOUNTER — ROUTINE PRENATAL (OUTPATIENT)
Dept: OBGYN CLINIC | Facility: CLINIC | Age: 29
End: 2024-11-26

## 2024-11-26 VITALS
DIASTOLIC BLOOD PRESSURE: 62 MMHG | BODY MASS INDEX: 27.19 KG/M2 | OXYGEN SATURATION: 99 % | WEIGHT: 169.2 LBS | HEART RATE: 77 BPM | HEIGHT: 66 IN | SYSTOLIC BLOOD PRESSURE: 110 MMHG

## 2024-11-26 DIAGNOSIS — Z3A.24 24 WEEKS GESTATION OF PREGNANCY: ICD-10-CM

## 2024-11-26 DIAGNOSIS — O99.012 ANEMIA OF PREGNANCY IN SECOND TRIMESTER: Primary | ICD-10-CM

## 2024-11-26 DIAGNOSIS — O35.EXX0 PYELECTASIS OF FETUS ON PRENATAL ULTRASOUND: ICD-10-CM

## 2024-11-26 PROCEDURE — PNV: Performed by: OBSTETRICS & GYNECOLOGY

## 2024-11-26 NOTE — PROGRESS NOTES
"Assessment & Plan  29 y.o.  at 24w3d presenting for routine prenatal visit.       Problem List       PCOS (polycystic ovarian syndrome)    Overview   Stopped metformin         Iron deficiency anemia    Vitamin D deficiency    Hypocalcemia    Anemia of pregnancy in second trimester    Overview   Hgb 10.9 on initial prenatal labs         Lack of immunity to hepatitis B virus demonstrated by serologic test    24 weeks gestation of pregnancy    Overview   Hep B NI  MSAFP negative  Quad screen ordered  Flu [ ]  Tdap [ ]  Delivery consent [ ]  GBS [ ]  Contraception [ ]         Pyelectasis of fetus on prenatal ultrasound     ____________________________________________________________  Subjective  She is without complaint.   She denies contractions, loss of fluid, or vaginal bleeding.   She feels regular fetal movements.       Objective  /62 (BP Location: Right arm, Patient Position: Sitting, Cuff Size: Standard)   Pulse 77   Ht 5' 5.5\" (1.664 m)   Wt 76.7 kg (169 lb 3.2 oz)   LMP 2024 (Exact Date)   SpO2 99%   BMI 27.73 kg/m²   FHR: 140's via doppler    Physical Exam  Constitutional:       Appearance: She is well-developed.   Cardiovascular:      Rate and Rhythm: Normal rate and regular rhythm.      Heart sounds: Normal heart sounds. No murmur heard.     No friction rub. No gallop.   Pulmonary:      Effort: Pulmonary effort is normal. No respiratory distress.      Breath sounds: No wheezing.   Abdominal:      Palpations: Abdomen is soft.      Tenderness: There is no abdominal tenderness.   Musculoskeletal:         General: No tenderness.   Neurological:      Mental Status: She is alert and oriented to person, place, and time.   Vitals reviewed.          Patient's Active Problem List  Patient Active Problem List   Diagnosis    PCOS (polycystic ovarian syndrome)    Iron deficiency anemia    Vitamin D deficiency    Hypocalcemia    Anemia of pregnancy in second trimester    Lack of immunity to " hepatitis B virus demonstrated by serologic test    24 weeks gestation of pregnancy    Pyelectasis of fetus on prenatal ultrasound           Esthela Khoury MD  11/26/2024  3:16 PM

## 2024-12-12 ENCOUNTER — APPOINTMENT (OUTPATIENT)
Dept: LAB | Age: 29
End: 2024-12-12
Payer: COMMERCIAL

## 2024-12-12 DIAGNOSIS — Z3A.24 24 WEEKS GESTATION OF PREGNANCY: ICD-10-CM

## 2024-12-12 LAB
ERYTHROCYTE [DISTWIDTH] IN BLOOD BY AUTOMATED COUNT: 13 % (ref 11.6–15.1)
HCT VFR BLD AUTO: 33 % (ref 34.8–46.1)
HGB BLD-MCNC: 10.9 G/DL (ref 11.5–15.4)
MCH RBC QN AUTO: 29.6 PG (ref 26.8–34.3)
MCHC RBC AUTO-ENTMCNC: 33 G/DL (ref 31.4–37.4)
MCV RBC AUTO: 90 FL (ref 82–98)
PLATELET # BLD AUTO: 231 THOUSANDS/UL (ref 149–390)
PMV BLD AUTO: 11.9 FL (ref 8.9–12.7)
RBC # BLD AUTO: 3.68 MILLION/UL (ref 3.81–5.12)
TREPONEMA PALLIDUM IGG+IGM AB [PRESENCE] IN SERUM OR PLASMA BY IMMUNOASSAY: NORMAL
WBC # BLD AUTO: 9.2 THOUSAND/UL (ref 4.31–10.16)

## 2024-12-12 PROCEDURE — 85027 COMPLETE CBC AUTOMATED: CPT

## 2024-12-12 PROCEDURE — 86780 TREPONEMA PALLIDUM: CPT

## 2024-12-12 PROCEDURE — 36415 COLL VENOUS BLD VENIPUNCTURE: CPT

## 2024-12-12 PROCEDURE — 82950 GLUCOSE TEST: CPT

## 2024-12-13 ENCOUNTER — RESULTS FOLLOW-UP (OUTPATIENT)
Dept: OBGYN CLINIC | Facility: CLINIC | Age: 29
End: 2024-12-13

## 2024-12-13 LAB — GLUCOSE 1H P 50 G GLC PO SERPL-MCNC: 95 MG/DL (ref 70–134)

## 2024-12-19 PROBLEM — Z3A.27 27 WEEKS GESTATION OF PREGNANCY: Status: ACTIVE | Noted: 2024-08-30

## 2024-12-20 ENCOUNTER — ROUTINE PRENATAL (OUTPATIENT)
Dept: OBGYN CLINIC | Facility: CLINIC | Age: 29
End: 2024-12-20

## 2024-12-20 ENCOUNTER — ULTRASOUND (OUTPATIENT)
Dept: PERINATAL CARE | Facility: CLINIC | Age: 29
End: 2024-12-20
Payer: COMMERCIAL

## 2024-12-20 VITALS
BODY MASS INDEX: 27.68 KG/M2 | WEIGHT: 172.2 LBS | OXYGEN SATURATION: 98 % | SYSTOLIC BLOOD PRESSURE: 100 MMHG | DIASTOLIC BLOOD PRESSURE: 54 MMHG | HEART RATE: 80 BPM | HEIGHT: 66 IN

## 2024-12-20 VITALS
DIASTOLIC BLOOD PRESSURE: 58 MMHG | HEIGHT: 66 IN | HEART RATE: 84 BPM | WEIGHT: 171 LBS | BODY MASS INDEX: 27.48 KG/M2 | SYSTOLIC BLOOD PRESSURE: 100 MMHG

## 2024-12-20 DIAGNOSIS — E55.9 VITAMIN D DEFICIENCY: ICD-10-CM

## 2024-12-20 DIAGNOSIS — Z3A.27 27 WEEKS GESTATION OF PREGNANCY: ICD-10-CM

## 2024-12-20 DIAGNOSIS — O35.EXX0 PYELECTASIS OF FETUS ON PRENATAL ULTRASOUND: ICD-10-CM

## 2024-12-20 DIAGNOSIS — Z36.89 ENCOUNTER FOR ULTRASOUND TO CHECK FETAL GROWTH: ICD-10-CM

## 2024-12-20 DIAGNOSIS — O09.92 ENCOUNTER FOR SUPERVISION OF HIGH RISK PREGNANCY IN SECOND TRIMESTER, ANTEPARTUM: Primary | ICD-10-CM

## 2024-12-20 DIAGNOSIS — O99.012 ANEMIA OF PREGNANCY IN SECOND TRIMESTER: ICD-10-CM

## 2024-12-20 DIAGNOSIS — Z3A.27 27 WEEKS GESTATION OF PREGNANCY: Primary | ICD-10-CM

## 2024-12-20 DIAGNOSIS — Z36.2 ENCOUNTER FOR FOLLOW-UP ULTRASOUND OF FETAL ANATOMY: ICD-10-CM

## 2024-12-20 PROCEDURE — 76816 OB US FOLLOW-UP PER FETUS: CPT | Performed by: STUDENT IN AN ORGANIZED HEALTH CARE EDUCATION/TRAINING PROGRAM

## 2024-12-20 PROCEDURE — PNV: Performed by: OBSTETRICS & GYNECOLOGY

## 2024-12-20 PROCEDURE — 99213 OFFICE O/P EST LOW 20 MIN: CPT | Performed by: STUDENT IN AN ORGANIZED HEALTH CARE EDUCATION/TRAINING PROGRAM

## 2024-12-20 NOTE — PROGRESS NOTES
"[employer]  #[id number] used for this encounter    Assessment & Plan  29 y.o.  at 27w6d presenting for routine prenatal visit.     Problem List       PCOS (polycystic ovarian syndrome)    Overview   Stopped metformin         Iron deficiency anemia    Vitamin D deficiency    Hypocalcemia    Anemia of pregnancy in second trimester    Overview   Hgb 10.9 on initial prenatal labs         Lack of immunity to hepatitis B virus demonstrated by serologic test    27 weeks gestation of pregnancy    Overview   Hep B NI  MSAFP negative  Quad screen ordered  Flu [ ]  Tdap [ ]  Delivery consent [ ]  GBS [ ]  Contraception [ ]         Pyelectasis of fetus on prenatal ultrasound     Other Visit Diagnoses         Encounter for supervision of high risk pregnancy in second trimester, antepartum    -  Primary          ____________________________________________________________  Subjective  She is without complaint.   She denies contractions, loss of fluid, or vaginal bleeding.   She feels regular fetal movements. Reviewed fetal kick counts, instructed patient to call if less than 10 kicks in 2 hours.    Objective  /54 (BP Location: Right arm, Patient Position: Sitting, Cuff Size: Adult)   Pulse 80   Ht 5' 5.5\" (1.664 m)   Wt 78.1 kg (172 lb 3.2 oz)   LMP 2024 (Exact Date)   SpO2 98%   BMI 28.22 kg/m²   FHR: 130 bpm  Fundal height 26 cm    Physical Exam  Constitutional:       Appearance: She is well-developed.   Cardiovascular:      Rate and Rhythm: Normal rate.   Pulmonary:      Effort: Pulmonary effort is normal. No respiratory distress.   Abdominal:      Palpations: Abdomen is soft.      Tenderness: There is no abdominal tenderness.   Skin:     General: Skin is warm and dry.          Patient's Active Problem List  Patient Active Problem List   Diagnosis    PCOS (polycystic ovarian syndrome)    Iron deficiency anemia    Vitamin D deficiency    Hypocalcemia    Anemia of pregnancy in second trimester    " Lack of immunity to hepatitis B virus demonstrated by serologic test    27 weeks gestation of pregnancy    Pyelectasis of fetus on prenatal ultrasound           Lakesha Betancourt MD  12/20/2024  10:49 AM

## 2024-12-20 NOTE — PROGRESS NOTES
"[facility]: Ms. Mcdonald was seen today for fetal growth and followup missed anatomy ultrasound.  See ultrasound report under \"OB Procedures\" tab.      MDM:   I. Diagnoses/Problems addressed:  low  II.  Data:  limited  III.  Risk of morbidity: Low    Please don't hesitate to contact our office with any concerns or questions.  -Kyleigh Garcias MD    "

## 2025-01-03 ENCOUNTER — TELEPHONE (OUTPATIENT)
Dept: OBGYN CLINIC | Facility: CLINIC | Age: 30
End: 2025-01-03

## 2025-01-03 ENCOUNTER — ROUTINE PRENATAL (OUTPATIENT)
Dept: OBGYN CLINIC | Facility: CLINIC | Age: 30
End: 2025-01-03

## 2025-01-03 VITALS
BODY MASS INDEX: 27.48 KG/M2 | WEIGHT: 171 LBS | HEART RATE: 89 BPM | DIASTOLIC BLOOD PRESSURE: 56 MMHG | SYSTOLIC BLOOD PRESSURE: 100 MMHG | HEIGHT: 66 IN

## 2025-01-03 DIAGNOSIS — O35.EXX0 PYELECTASIS OF FETUS ON PRENATAL ULTRASOUND: ICD-10-CM

## 2025-01-03 DIAGNOSIS — O09.93 ENCOUNTER FOR SUPERVISION OF HIGH RISK PREGNANCY IN THIRD TRIMESTER, ANTEPARTUM: Primary | ICD-10-CM

## 2025-01-03 DIAGNOSIS — O99.012 ANEMIA OF PREGNANCY IN SECOND TRIMESTER: ICD-10-CM

## 2025-01-03 DIAGNOSIS — Z3A.29 29 WEEKS GESTATION OF PREGNANCY: ICD-10-CM

## 2025-01-03 PROCEDURE — PNV: Performed by: OBSTETRICS & GYNECOLOGY

## 2025-01-03 NOTE — TELEPHONE ENCOUNTER
Overall how are you feeling? Patient states she is doing well.    Compliant with routine OB appointments? Yes  Have you completed your 3rd trimester lab work? Yes    Have you reviewed the contents of 3rd trimester folder from office?  No, patient will receive folder at next prenatal appointment and contents will be reviewed at that time.   Have you decided on a pediatrician? No    If yes, who:   Pediatrician list reviewed with patient and spouse today.     If no, what are you looking for and request sent for outreach.  Questions on paperwork to go back to office? Patient will receive folder and forms at next prenatal appointment.     Questions on the baby birth certificate forms? Patient will receive folder and forms at next prenatal appointment.      Sent link for the Hospital Readiness Video via [employer]    Opt out

## 2025-01-03 NOTE — PROGRESS NOTES
"Assessment & Plan  30 y.o.  at 29w6d presenting for routine prenatal visit.     Problem List       PCOS (polycystic ovarian syndrome)    Overview   Stopped metformin         Iron deficiency anemia    Vitamin D deficiency    Hypocalcemia    Anemia of pregnancy in second trimester    Overview   Hgb 10.9 on initial prenatal labs, repeat 10.9         Lack of immunity to hepatitis B virus demonstrated by serologic test    29 weeks gestation of pregnancy    Overview   Hep B NI  MSAFP negative  Quad screen ordered  Flu [ ]  Tdap [ ]  Delivery consent [ ]  GBS [ ]  Contraception [ ]         Pyelectasis of fetus on prenatal ultrasound    Overview   F/u repeat US          Other Visit Diagnoses         Encounter for supervision of high risk pregnancy in third trimester, antepartum    -  Primary          ____________________________________________________________  Subjective  She reports occasional vomiting, declines medications at this time.  She denies contractions, loss of fluid, or vaginal bleeding.   She feels regular fetal movements.     Objective  /56 (BP Location: Right arm, Cuff Size: Standard)   Pulse 89   Ht 5' 5.5\" (1.664 m)   Wt 77.6 kg (171 lb)   LMP 2024 (Exact Date)   BMI 28.02 kg/m²   FHR: 140 bpm  Fundal height 27 cm    Physical Exam  Constitutional:       Appearance: She is well-developed.   Cardiovascular:      Rate and Rhythm: Normal rate.   Pulmonary:      Effort: Pulmonary effort is normal. No respiratory distress.   Abdominal:      Palpations: Abdomen is soft.      Tenderness: There is no abdominal tenderness.   Skin:     General: Skin is warm and dry.          Patient's Active Problem List  Patient Active Problem List   Diagnosis    PCOS (polycystic ovarian syndrome)    Iron deficiency anemia    Vitamin D deficiency    Hypocalcemia    Anemia of pregnancy in second trimester    Lack of immunity to hepatitis B virus demonstrated by serologic test    29 weeks gestation of pregnancy "    Pyelectasis of fetus on prenatal ultrasound           Lakesha Betancourt MD  1/3/2025  11:39 AM

## 2025-01-16 ENCOUNTER — ROUTINE PRENATAL (OUTPATIENT)
Dept: OBGYN CLINIC | Facility: CLINIC | Age: 30
End: 2025-01-16
Payer: COMMERCIAL

## 2025-01-16 VITALS
SYSTOLIC BLOOD PRESSURE: 108 MMHG | HEIGHT: 66 IN | OXYGEN SATURATION: 100 % | BODY MASS INDEX: 28.9 KG/M2 | HEART RATE: 78 BPM | DIASTOLIC BLOOD PRESSURE: 62 MMHG | WEIGHT: 179.8 LBS

## 2025-01-16 DIAGNOSIS — O99.013 ANEMIA OF MOTHER IN PREGNANCY, ANTEPARTUM, THIRD TRIMESTER: Primary | ICD-10-CM

## 2025-01-16 DIAGNOSIS — Z23 ENCOUNTER FOR IMMUNIZATION: ICD-10-CM

## 2025-01-16 DIAGNOSIS — Z3A.31 31 WEEKS GESTATION OF PREGNANCY: ICD-10-CM

## 2025-01-16 PROCEDURE — 90471 IMMUNIZATION ADMIN: CPT | Performed by: OBSTETRICS & GYNECOLOGY

## 2025-01-16 PROCEDURE — 90715 TDAP VACCINE 7 YRS/> IM: CPT | Performed by: OBSTETRICS & GYNECOLOGY

## 2025-01-16 PROCEDURE — PNV: Performed by: OBSTETRICS & GYNECOLOGY

## 2025-01-16 NOTE — PROGRESS NOTES
"Assessment & Plan  30 y.o.  at 31w5d presenting for routine prenatal visit.       Problem List       PCOS (polycystic ovarian syndrome)    Overview   Stopped metformin         Iron deficiency anemia    Vitamin D deficiency    Hypocalcemia    Anemia of mother in pregnancy, antepartum, third trimester    Overview   Hgb 10.9 on initial prenatal labs, repeat 10.9         Lack of immunity to hepatitis B virus demonstrated by serologic test    31 weeks gestation of pregnancy    Overview   Hep B NI  MSAFP negative  Quad screen ordered  Flu [ ]  Tdap [ ]  Delivery consent [ ]  GBS [ ]  Contraception [ ]         Pyelectasis of fetus on prenatal ultrasound    Overview   F/u repeat US          ____________________________________________________________  Subjective  She is without complaint.   She denies contractions, loss of fluid, or vaginal bleeding.   She feels regular fetal movements.       Objective  /62 (BP Location: Left arm, Patient Position: Sitting, Cuff Size: Standard)   Pulse 78   Ht 5' 5.5\" (1.664 m)   Wt 81.6 kg (179 lb 12.8 oz)   LMP 2024 (Exact Date)   SpO2 100%   BMI 29.47 kg/m²   FHR: 140's via doppler    Physical Exam  Constitutional:       Appearance: She is well-developed.   Cardiovascular:      Rate and Rhythm: Normal rate and regular rhythm.      Heart sounds: Normal heart sounds. No murmur heard.     No friction rub. No gallop.   Pulmonary:      Effort: Pulmonary effort is normal. No respiratory distress.      Breath sounds: No wheezing.   Abdominal:      Palpations: Abdomen is soft.      Tenderness: There is no abdominal tenderness.   Musculoskeletal:         General: No tenderness.   Neurological:      Mental Status: She is alert and oriented to person, place, and time.   Vitals reviewed.        Patient's Active Problem List  Patient Active Problem List   Diagnosis    PCOS (polycystic ovarian syndrome)    Iron deficiency anemia    Vitamin D deficiency    Hypocalcemia    " Anemia of mother in pregnancy, antepartum, third trimester    Lack of immunity to hepatitis B virus demonstrated by serologic test    31 weeks gestation of pregnancy    Pyelectasis of fetus on prenatal ultrasound           Esthela Khoury MD  1/16/2025  2:03 PM

## 2025-01-20 ENCOUNTER — PATIENT MESSAGE (OUTPATIENT)
Dept: OBGYN CLINIC | Facility: CLINIC | Age: 30
End: 2025-01-20

## 2025-01-20 ENCOUNTER — ULTRASOUND (OUTPATIENT)
Dept: PERINATAL CARE | Facility: CLINIC | Age: 30
End: 2025-01-20
Payer: COMMERCIAL

## 2025-01-20 VITALS
HEART RATE: 75 BPM | BODY MASS INDEX: 28.38 KG/M2 | DIASTOLIC BLOOD PRESSURE: 56 MMHG | SYSTOLIC BLOOD PRESSURE: 104 MMHG | WEIGHT: 176.6 LBS | HEIGHT: 66 IN | OXYGEN SATURATION: 98 %

## 2025-01-20 DIAGNOSIS — Z36.89 ENCOUNTER FOR ULTRASOUND TO CHECK FETAL GROWTH: ICD-10-CM

## 2025-01-20 DIAGNOSIS — Z3A.32 32 WEEKS GESTATION OF PREGNANCY: Primary | ICD-10-CM

## 2025-01-20 DIAGNOSIS — O35.EXX0 PYELECTASIS OF FETUS ON PRENATAL ULTRASOUND: ICD-10-CM

## 2025-01-20 PROCEDURE — 99213 OFFICE O/P EST LOW 20 MIN: CPT | Performed by: STUDENT IN AN ORGANIZED HEALTH CARE EDUCATION/TRAINING PROGRAM

## 2025-01-20 PROCEDURE — 76816 OB US FOLLOW-UP PER FETUS: CPT | Performed by: STUDENT IN AN ORGANIZED HEALTH CARE EDUCATION/TRAINING PROGRAM

## 2025-01-20 NOTE — PROGRESS NOTES
"[facility]: Ms. Mcdonald was seen today for fetal growth assessment ultrasound.  See ultrasound report under \"OB Procedures\" tab.   The time spent on this established patient on the encounter date included 5 minutes previsit service time reviewing records and precharting, 5 minutes face-to-face service time counseling regarding results and coordinating care, and  5 minutes charting, totalling 15 minutes.  Please don't hesitate to contact our office with any concerns or questions.  -Kyleigh Garcias MD    "

## 2025-02-04 ENCOUNTER — ROUTINE PRENATAL (OUTPATIENT)
Dept: OBGYN CLINIC | Facility: CLINIC | Age: 30
End: 2025-02-04

## 2025-02-04 VITALS
HEIGHT: 66 IN | SYSTOLIC BLOOD PRESSURE: 112 MMHG | BODY MASS INDEX: 28.7 KG/M2 | OXYGEN SATURATION: 100 % | HEART RATE: 84 BPM | WEIGHT: 178.6 LBS | DIASTOLIC BLOOD PRESSURE: 72 MMHG

## 2025-02-04 DIAGNOSIS — Z3A.34 34 WEEKS GESTATION OF PREGNANCY: ICD-10-CM

## 2025-02-04 DIAGNOSIS — O99.013 ANEMIA OF MOTHER IN PREGNANCY, ANTEPARTUM, THIRD TRIMESTER: Primary | ICD-10-CM

## 2025-02-04 LAB
DME PARACHUTE DELIVERY DATE REQUESTED: NORMAL
DME PARACHUTE ITEM DESCRIPTION: NORMAL
DME PARACHUTE ORDER STATUS: NORMAL
DME PARACHUTE SUPPLIER NAME: NORMAL
DME PARACHUTE SUPPLIER PHONE: NORMAL

## 2025-02-04 PROCEDURE — PNV: Performed by: OBSTETRICS & GYNECOLOGY

## 2025-02-04 NOTE — PROGRESS NOTES
"Assessment & Plan  30 y.o.  at 34w3d presenting for routine prenatal visit.       Problem List       PCOS (polycystic ovarian syndrome)    Overview   Stopped metformin         Iron deficiency anemia    Vitamin D deficiency    Hypocalcemia    Anemia of mother in pregnancy, antepartum, third trimester    Overview   Hgb 10.9 on initial prenatal labs, repeat 10.9         Lack of immunity to hepatitis B virus demonstrated by serologic test    34 weeks gestation of pregnancy    Overview   Hep B NI  MSAFP negative  Quad screen negative  Flu [ ]  Tdap [X]  Delivery consent [X]  GBS [ ]  Contraception [ ]          ____________________________________________________________  Subjective  She reports some brown discharge after vaginal bleeding that she was seen at [facility] for 10 days ago. She wasn't told she had any signs of infection and wasn't prescribed anything.  She denies contractions, loss of fluid, or vaginal bleeding.   She feels regular fetal movements.       Objective  /72 (BP Location: Right arm, Patient Position: Sitting, Cuff Size: Standard)   Pulse 84   Ht 5' 5.5\" (1.664 m)   Wt 81 kg (178 lb 9.6 oz)   LMP 2024 (Exact Date)   SpO2 100%   BMI 29.27 kg/m²   FHR: 120's via doppler    Physical Exam  Constitutional:       Appearance: She is well-developed.   Cardiovascular:      Rate and Rhythm: Normal rate and regular rhythm.      Heart sounds: Normal heart sounds. No murmur heard.     No friction rub. No gallop.   Pulmonary:      Effort: Pulmonary effort is normal. No respiratory distress.      Breath sounds: No wheezing.   Abdominal:      Palpations: Abdomen is soft.      Tenderness: There is no abdominal tenderness.   Musculoskeletal:         General: No tenderness.   Neurological:      Mental Status: She is alert and oriented to person, place, and time.   Vitals reviewed.          Patient's Active Problem List  Patient Active Problem List   Diagnosis    PCOS (polycystic ovarian " syndrome)    Iron deficiency anemia    Vitamin D deficiency    Hypocalcemia    Anemia of mother in pregnancy, antepartum, third trimester    Lack of immunity to hepatitis B virus demonstrated by serologic test    34 weeks gestation of pregnancy           Esthela Khoury MD  2/4/2025  4:37 PM

## 2025-02-07 ENCOUNTER — APPOINTMENT (OUTPATIENT)
Dept: LAB | Age: 30
End: 2025-02-07
Payer: COMMERCIAL

## 2025-02-07 DIAGNOSIS — Z11.3 SCREENING EXAMINATION FOR VENEREAL DISEASE: ICD-10-CM

## 2025-02-07 LAB — RUBV IGG SERPL IA-ACNC: 14.1 IU/ML

## 2025-02-07 PROCEDURE — 87491 CHLMYD TRACH DNA AMP PROBE: CPT

## 2025-02-07 PROCEDURE — 86735 MUMPS ANTIBODY: CPT

## 2025-02-07 PROCEDURE — 86787 VARICELLA-ZOSTER ANTIBODY: CPT

## 2025-02-07 PROCEDURE — 86706 HEP B SURFACE ANTIBODY: CPT

## 2025-02-07 PROCEDURE — 86780 TREPONEMA PALLIDUM: CPT

## 2025-02-07 PROCEDURE — 36415 COLL VENOUS BLD VENIPUNCTURE: CPT

## 2025-02-07 PROCEDURE — 86765 RUBEOLA ANTIBODY: CPT

## 2025-02-07 PROCEDURE — 86480 TB TEST CELL IMMUN MEASURE: CPT

## 2025-02-07 PROCEDURE — 87591 N.GONORRHOEAE DNA AMP PROB: CPT

## 2025-02-07 PROCEDURE — 86762 RUBELLA ANTIBODY: CPT

## 2025-02-08 LAB
C TRACH DNA SPEC QL NAA+PROBE: NEGATIVE
HBV SURFACE AB SER-ACNC: 5.03 MIU/ML
MUV IGG SER QL IA: NORMAL
N GONORRHOEA DNA SPEC QL NAA+PROBE: NEGATIVE
TREPONEMA PALLIDUM IGG+IGM AB [PRESENCE] IN SERUM OR PLASMA BY IMMUNOASSAY: NORMAL
VZV IGG SER QL IA: NORMAL

## 2025-02-09 LAB
GAMMA INTERFERON BACKGROUND BLD IA-ACNC: 0 IU/ML
M TB IFN-G BLD-IMP: NEGATIVE
M TB IFN-G CD4+ BCKGRND COR BLD-ACNC: 0 IU/ML
M TB IFN-G CD4+ BCKGRND COR BLD-ACNC: 0.01 IU/ML
MITOGEN IGNF BCKGRD COR BLD-ACNC: 4.96 IU/ML

## 2025-02-12 ENCOUNTER — ROUTINE PRENATAL (OUTPATIENT)
Dept: OBGYN CLINIC | Facility: CLINIC | Age: 30
End: 2025-02-12

## 2025-02-12 VITALS
HEART RATE: 96 BPM | HEIGHT: 66 IN | DIASTOLIC BLOOD PRESSURE: 68 MMHG | SYSTOLIC BLOOD PRESSURE: 102 MMHG | OXYGEN SATURATION: 99 % | BODY MASS INDEX: 28.77 KG/M2 | WEIGHT: 179 LBS

## 2025-02-12 DIAGNOSIS — O99.013 ANEMIA OF MOTHER IN PREGNANCY, ANTEPARTUM, THIRD TRIMESTER: Primary | ICD-10-CM

## 2025-02-12 DIAGNOSIS — Z3A.35 35 WEEKS GESTATION OF PREGNANCY: ICD-10-CM

## 2025-02-12 DIAGNOSIS — Z01.84 LACK OF IMMUNITY TO HEPATITIS B VIRUS DEMONSTRATED BY SEROLOGIC TEST: ICD-10-CM

## 2025-02-12 PROCEDURE — 87150 DNA/RNA AMPLIFIED PROBE: CPT | Performed by: OBSTETRICS & GYNECOLOGY

## 2025-02-12 PROCEDURE — PNV: Performed by: OBSTETRICS & GYNECOLOGY

## 2025-02-12 NOTE — PROGRESS NOTES
Pt is a 30 y.o.  35w4d  Pregnancy is complicated by anemia, hep b non -immune, vaginal bleeding in the third trimester (no bleeding after the one episode)      Pt reports +FM. Denies vb, lof, ctx. PTL precautions and fkc reviewed    GBS collected    Pt questioning if she can use whitening cream in her axilla and groin due to discoloration during pregnancy--advised patient this is hormonal in nature and she should not use anything of this nature while pregnant and or breast feeding. Reviewed it may or may not improve after delivery. She verbalizes understanding    Also reports she is interested in elective IOL on a day I am on call if possible--reviewed can plan for delivery 3/14 if no spontaneous labor and is agreeable    Pt questioning epidural, primary elective c/s, general anesthesia, etc. All questions answered and she has decided to plan for vaginal delivery with epidural    F/u weekly.

## 2025-02-14 LAB
GP B STREP DNA SPEC QL NAA+PROBE: NEGATIVE
MEV IGG SER IA-ACNC: 200 AU/ML
MEV IGM TITR SER IF: ABNORMAL TITER

## 2025-02-19 ENCOUNTER — TELEPHONE (OUTPATIENT)
Dept: OBGYN CLINIC | Facility: CLINIC | Age: 30
End: 2025-02-19

## 2025-02-20 ENCOUNTER — ROUTINE PRENATAL (OUTPATIENT)
Dept: OBGYN CLINIC | Facility: CLINIC | Age: 30
End: 2025-02-20

## 2025-02-20 VITALS
BODY MASS INDEX: 30.66 KG/M2 | SYSTOLIC BLOOD PRESSURE: 122 MMHG | HEART RATE: 87 BPM | DIASTOLIC BLOOD PRESSURE: 68 MMHG | WEIGHT: 184 LBS | HEIGHT: 65 IN | OXYGEN SATURATION: 97 %

## 2025-02-20 DIAGNOSIS — O99.013 ANEMIA OF MOTHER IN PREGNANCY, ANTEPARTUM, THIRD TRIMESTER: ICD-10-CM

## 2025-02-20 DIAGNOSIS — O09.93 ENCOUNTER FOR SUPERVISION OF HIGH RISK PREGNANCY IN THIRD TRIMESTER, ANTEPARTUM: Primary | ICD-10-CM

## 2025-02-20 DIAGNOSIS — Z3A.36 36 WEEKS GESTATION OF PREGNANCY: ICD-10-CM

## 2025-02-20 PROCEDURE — PNV: Performed by: OBSTETRICS & GYNECOLOGY

## 2025-02-20 NOTE — PROGRESS NOTES
"Patient declined use of translation services    Assessment & Plan  30 y.o.  at 36w5d presenting for routine prenatal visit.     Problem List       PCOS (polycystic ovarian syndrome)    Overview   Stopped metformin         Iron deficiency anemia    Vitamin D deficiency    Hypocalcemia    Anemia of mother in pregnancy, antepartum, third trimester    Overview   Hgb 10.9 on initial prenatal labs, repeat 10.9         Lack of immunity to hepatitis B virus demonstrated by serologic test    36 weeks gestation of pregnancy    Overview   Hep B NI  MSAFP negative  Quad screen negative  Flu [ ]  Tdap [X]  Delivery consent [X]  GBS neg  Contraception [ ]          Other Visit Diagnoses         Encounter for supervision of high risk pregnancy in third trimester, antepartum    -  Primary          ____________________________________________________________  Subjective  She reports sometimes not feeling baby move as much, but has felt movement today; discussed performing kick counts, and to call immediately if she counts less than 10 kicks in 2 hours  She denies contractions, loss of fluid, or vaginal bleeding.     Objective  /68 (BP Location: Right arm, Patient Position: Sitting, Cuff Size: Standard)   Pulse 87   Ht 5' 5\" (1.651 m)   Wt 83.5 kg (184 lb)   LMP 2024 (Exact Date)   SpO2 97%   BMI 30.62 kg/m²   FHR: 130 bpm  Fundal height 36 cm    Physical Exam  Constitutional:       Appearance: She is well-developed.   Cardiovascular:      Rate and Rhythm: Normal rate.   Pulmonary:      Effort: Pulmonary effort is normal. No respiratory distress.   Abdominal:      Palpations: Abdomen is soft.      Tenderness: There is no abdominal tenderness.   Skin:     General: Skin is warm and dry.          Patient's Active Problem List  Patient Active Problem List   Diagnosis    PCOS (polycystic ovarian syndrome)    Iron deficiency anemia    Vitamin D deficiency    Hypocalcemia    Anemia of mother in pregnancy, antepartum, " third trimester    Lack of immunity to hepatitis B virus demonstrated by serologic test    36 weeks gestation of pregnancy           Lakesha Betancourt MD  2/20/2025  1:46 PM

## 2025-02-25 ENCOUNTER — ROUTINE PRENATAL (OUTPATIENT)
Dept: OBGYN CLINIC | Facility: CLINIC | Age: 30
End: 2025-02-25

## 2025-02-25 VITALS
SYSTOLIC BLOOD PRESSURE: 116 MMHG | BODY MASS INDEX: 29.99 KG/M2 | OXYGEN SATURATION: 100 % | DIASTOLIC BLOOD PRESSURE: 78 MMHG | WEIGHT: 180 LBS | HEART RATE: 86 BPM | HEIGHT: 65 IN

## 2025-02-25 DIAGNOSIS — O99.013 ANEMIA OF MOTHER IN PREGNANCY, ANTEPARTUM, THIRD TRIMESTER: Primary | ICD-10-CM

## 2025-02-25 DIAGNOSIS — Z3A.37 37 WEEKS GESTATION OF PREGNANCY: ICD-10-CM

## 2025-02-25 PROCEDURE — PNV: Performed by: OBSTETRICS & GYNECOLOGY

## 2025-02-25 NOTE — PROGRESS NOTES
"Assessment & Plan  30 y.o.  at 37w3d presenting for routine prenatal visit.       Problem List       PCOS (polycystic ovarian syndrome)    Overview   Stopped metformin         Iron deficiency anemia    Vitamin D deficiency    Hypocalcemia    Anemia of mother in pregnancy, antepartum, third trimester    Overview   Hgb 10.9 on initial prenatal labs, repeat 10.9         Lack of immunity to hepatitis B virus demonstrated by serologic test    37 weeks gestation of pregnancy    Overview   Hep B NI  MSAFP negative  Quad screen negative  Flu [ ]  Tdap [X]  Delivery consent [X]  GBS neg  Contraception [ ]  Would induction on 3/14 with Dr. Guaman          ____________________________________________________________  Subjective  She is without complaint.   She denies contractions, loss of fluid, or vaginal bleeding.   She feels regular fetal movements.       Objective  /78 (BP Location: Right arm, Patient Position: Sitting, Cuff Size: Standard)   Pulse 86   Ht 5' 5\" (1.651 m)   Wt 81.6 kg (180 lb)   LMP 2024 (Exact Date)   SpO2 100%   BMI 29.95 kg/m²   FHR: 120's via doppler    Physical Exam  Constitutional:       Appearance: She is well-developed.   Cardiovascular:      Rate and Rhythm: Normal rate and regular rhythm.      Heart sounds: Normal heart sounds. No murmur heard.     No friction rub. No gallop.   Pulmonary:      Effort: Pulmonary effort is normal. No respiratory distress.      Breath sounds: No wheezing.   Abdominal:      Palpations: Abdomen is soft.      Tenderness: There is no abdominal tenderness.   Musculoskeletal:         General: No tenderness.   Neurological:      Mental Status: She is alert and oriented to person, place, and time.   Vitals reviewed.          Patient's Active Problem List  Patient Active Problem List   Diagnosis    PCOS (polycystic ovarian syndrome)    Iron deficiency anemia    Vitamin D deficiency    Hypocalcemia    Anemia of mother in pregnancy, antepartum, third " trimester    Lack of immunity to hepatitis B virus demonstrated by serologic test    37 weeks gestation of pregnancy           Esthela hKoury MD  2/25/2025  4:49 PM

## 2025-03-01 ENCOUNTER — ANESTHESIA EVENT (INPATIENT)
Dept: ANESTHESIOLOGY | Facility: HOSPITAL | Age: 30
End: 2025-03-01
Payer: COMMERCIAL

## 2025-03-01 ENCOUNTER — HOSPITAL ENCOUNTER (INPATIENT)
Facility: HOSPITAL | Age: 30
LOS: 1 days | Discharge: HOME | End: 2025-03-02
Attending: OBSTETRICS & GYNECOLOGY | Admitting: OBSTETRICS & GYNECOLOGY
Payer: COMMERCIAL

## 2025-03-01 ENCOUNTER — ANESTHESIA (INPATIENT)
Dept: ANESTHESIOLOGY | Facility: HOSPITAL | Age: 30
End: 2025-03-01
Payer: COMMERCIAL

## 2025-03-01 PROBLEM — Z37.9 NORMAL LABOR: Status: ACTIVE | Noted: 2025-03-01

## 2025-03-01 PROBLEM — Z3A.38 38 WEEKS GESTATION OF PREGNANCY: Status: ACTIVE | Noted: 2024-08-30

## 2025-03-01 PROBLEM — Z28.39 RUBELLA NON-IMMUNE STATUS, ANTEPARTUM: Status: ACTIVE | Noted: 2025-03-01

## 2025-03-01 PROBLEM — O09.899 RUBELLA NON-IMMUNE STATUS, ANTEPARTUM: Status: ACTIVE | Noted: 2025-03-01

## 2025-03-01 LAB
ABO GROUP BLD: NORMAL
BASE EXCESS BLDCOA CALC-SCNC: -5.3 MMOL/L (ref 3–11)
BLD GP AB SCN SERPL QL: NEGATIVE
ERYTHROCYTE [DISTWIDTH] IN BLOOD BY AUTOMATED COUNT: 13.4 % (ref 11.6–15.1)
HCO3 BLDCOA-SCNC: 20.7 MMOL/L (ref 17.3–27.3)
HCT VFR BLD AUTO: 36 % (ref 34.8–46.1)
HGB BLD-MCNC: 12.2 G/DL (ref 11.5–15.4)
HOLD SPECIMEN: YES
MCH RBC QN AUTO: 29.6 PG (ref 26.8–34.3)
MCHC RBC AUTO-ENTMCNC: 33.9 G/DL (ref 31.4–37.4)
MCV RBC AUTO: 87 FL (ref 82–98)
O2 CT VFR BLDCOA CALC: 13 ML/DL
OXYHGB MFR BLDCOA: 62.2 %
PCO2 BLDCOA: 41.9 MM[HG] (ref 30–60)
PH BLDCOA: 7.31 [PH] (ref 7.23–7.43)
PLATELET # BLD AUTO: 165 THOUSANDS/UL (ref 149–390)
PMV BLD AUTO: 14.2 FL (ref 8.9–12.7)
PO2 BLDCOA: 28.4 MM HG (ref 5–25)
RBC # BLD AUTO: 4.12 MILLION/UL (ref 3.81–5.12)
RH BLD: POSITIVE
SPECIMEN EXPIRATION DATE: NORMAL
WBC # BLD AUTO: 10.08 THOUSAND/UL (ref 4.31–10.16)

## 2025-03-01 PROCEDURE — 82805 BLOOD GASES W/O2 SATURATION: CPT | Performed by: OBSTETRICS & GYNECOLOGY

## 2025-03-01 PROCEDURE — 0KQM0ZZ REPAIR PERINEUM MUSCLE, OPEN APPROACH: ICD-10-PCS | Performed by: OBSTETRICS & GYNECOLOGY

## 2025-03-01 PROCEDURE — 86850 RBC ANTIBODY SCREEN: CPT

## 2025-03-01 PROCEDURE — 86780 TREPONEMA PALLIDUM: CPT

## 2025-03-01 PROCEDURE — NC001 PR NO CHARGE: Performed by: OBSTETRICS & GYNECOLOGY

## 2025-03-01 PROCEDURE — 4A1HXCZ MONITORING OF PRODUCTS OF CONCEPTION, CARDIAC RATE, EXTERNAL APPROACH: ICD-10-PCS | Performed by: OBSTETRICS & GYNECOLOGY

## 2025-03-01 PROCEDURE — 85027 COMPLETE CBC AUTOMATED: CPT

## 2025-03-01 PROCEDURE — 59400 OBSTETRICAL CARE: CPT | Performed by: OBSTETRICS & GYNECOLOGY

## 2025-03-01 PROCEDURE — 86901 BLOOD TYPING SEROLOGIC RH(D): CPT

## 2025-03-01 PROCEDURE — 86900 BLOOD TYPING SEROLOGIC ABO: CPT

## 2025-03-01 RX ORDER — SIMETHICONE 80 MG
80 TABLET,CHEWABLE ORAL 4 TIMES DAILY PRN
Status: DISCONTINUED | OUTPATIENT
Start: 2025-03-01 | End: 2025-03-02 | Stop reason: HOSPADM

## 2025-03-01 RX ORDER — ROPIVACAINE HYDROCHLORIDE 2 MG/ML
INJECTION, SOLUTION EPIDURAL; INFILTRATION; PERINEURAL AS NEEDED
Status: DISCONTINUED | OUTPATIENT
Start: 2025-03-01 | End: 2025-03-01 | Stop reason: HOSPADM

## 2025-03-01 RX ORDER — CALCIUM CARBONATE 500 MG/1
1000 TABLET, CHEWABLE ORAL 3 TIMES DAILY PRN
Status: DISCONTINUED | OUTPATIENT
Start: 2025-03-01 | End: 2025-03-01

## 2025-03-01 RX ORDER — ONDANSETRON 2 MG/ML
4 INJECTION INTRAMUSCULAR; INTRAVENOUS EVERY 6 HOURS PRN
Status: DISCONTINUED | OUTPATIENT
Start: 2025-03-01 | End: 2025-03-01

## 2025-03-01 RX ORDER — ACETAMINOPHEN 325 MG/1
975 TABLET ORAL EVERY 8 HOURS PRN
Status: DISCONTINUED | OUTPATIENT
Start: 2025-03-01 | End: 2025-03-02 | Stop reason: HOSPADM

## 2025-03-01 RX ORDER — OXYTOCIN/RINGER'S LACTATE 30/500 ML
PLASTIC BAG, INJECTION (ML) INTRAVENOUS
Status: DISPENSED
Start: 2025-03-01 | End: 2025-03-01

## 2025-03-01 RX ORDER — BENZOCAINE/MENTHOL 6 MG-10 MG
1 LOZENGE MUCOUS MEMBRANE DAILY PRN
Status: DISCONTINUED | OUTPATIENT
Start: 2025-03-01 | End: 2025-03-02 | Stop reason: HOSPADM

## 2025-03-01 RX ORDER — IBUPROFEN 600 MG/1
600 TABLET, FILM COATED ORAL EVERY 6 HOURS
Status: DISCONTINUED | OUTPATIENT
Start: 2025-03-01 | End: 2025-03-02 | Stop reason: HOSPADM

## 2025-03-01 RX ORDER — LIDOCAINE HYDROCHLORIDE AND EPINEPHRINE 15; 5 MG/ML; UG/ML
INJECTION, SOLUTION EPIDURAL AS NEEDED
Status: DISCONTINUED | OUTPATIENT
Start: 2025-03-01 | End: 2025-03-01 | Stop reason: HOSPADM

## 2025-03-01 RX ORDER — BUPIVACAINE HYDROCHLORIDE 2.5 MG/ML
30 INJECTION, SOLUTION EPIDURAL; INFILTRATION; INTRACAUDAL ONCE AS NEEDED
Status: DISCONTINUED | OUTPATIENT
Start: 2025-03-01 | End: 2025-03-01

## 2025-03-01 RX ORDER — ACETAMINOPHEN 325 MG/1
650 TABLET ORAL EVERY 6 HOURS
Status: DISCONTINUED | OUTPATIENT
Start: 2025-03-01 | End: 2025-03-02 | Stop reason: HOSPADM

## 2025-03-01 RX ORDER — OXYTOCIN/RINGER'S LACTATE 30/500 ML
250 PLASTIC BAG, INJECTION (ML) INTRAVENOUS ONCE
Status: DISCONTINUED | OUTPATIENT
Start: 2025-03-01 | End: 2025-03-02 | Stop reason: HOSPADM

## 2025-03-01 RX ORDER — SODIUM CHLORIDE, SODIUM LACTATE, POTASSIUM CHLORIDE, CALCIUM CHLORIDE 600; 310; 30; 20 MG/100ML; MG/100ML; MG/100ML; MG/100ML
125 INJECTION, SOLUTION INTRAVENOUS CONTINUOUS
Status: DISCONTINUED | OUTPATIENT
Start: 2025-03-01 | End: 2025-03-01

## 2025-03-01 RX ORDER — ONDANSETRON 2 MG/ML
4 INJECTION INTRAMUSCULAR; INTRAVENOUS EVERY 8 HOURS PRN
Status: DISCONTINUED | OUTPATIENT
Start: 2025-03-01 | End: 2025-03-02 | Stop reason: HOSPADM

## 2025-03-01 RX ORDER — CALCIUM CARBONATE 500 MG/1
1000 TABLET, CHEWABLE ORAL DAILY PRN
Status: DISCONTINUED | OUTPATIENT
Start: 2025-03-01 | End: 2025-03-02 | Stop reason: HOSPADM

## 2025-03-01 RX ADMIN — ROPIVACAINE HYDROCHLORIDE 10 ML/HR: 2 INJECTION EPIDURAL; INFILTRATION; PERINEURAL at 08:49

## 2025-03-01 RX ADMIN — SODIUM CHLORIDE, SODIUM LACTATE, POTASSIUM CHLORIDE, AND CALCIUM CHLORIDE 125 ML/HR: .6; .31; .03; .02 INJECTION, SOLUTION INTRAVENOUS at 08:01

## 2025-03-01 RX ADMIN — ACETAMINOPHEN 650 MG: 325 TABLET, FILM COATED ORAL at 20:10

## 2025-03-01 RX ADMIN — LIDOCAINE HYDROCHLORIDE AND EPINEPHRINE 3 ML: 15; 5 INJECTION, SOLUTION EPIDURAL; INFILTRATION; INTRACAUDAL; PERINEURAL at 08:32

## 2025-03-01 RX ADMIN — ROPIVACAINE HYDROCHLORIDE: 2 INJECTION, SOLUTION EPIDURAL; INFILTRATION at 09:00

## 2025-03-01 RX ADMIN — SODIUM CHLORIDE, SODIUM LACTATE, POTASSIUM CHLORIDE, AND CALCIUM CHLORIDE 1000 ML: .6; .31; .03; .02 INJECTION, SOLUTION INTRAVENOUS at 07:30

## 2025-03-01 RX ADMIN — BENZOCAINE AND LEVOMENTHOL 1 APPLICATION: 200; 5 SPRAY TOPICAL at 17:38

## 2025-03-01 RX ADMIN — IBUPROFEN 600 MG: 600 TABLET, FILM COATED ORAL at 20:10

## 2025-03-01 RX ADMIN — ROPIVACAINE HYDROCHLORIDE 10 ML: 2 INJECTION EPIDURAL; INFILTRATION; PERINEURAL at 08:42

## 2025-03-01 RX ADMIN — SODIUM CHLORIDE, SODIUM LACTATE, POTASSIUM CHLORIDE, AND CALCIUM CHLORIDE 125 ML/HR: .6; .31; .03; .02 INJECTION, SOLUTION INTRAVENOUS at 10:25

## 2025-03-01 NOTE — ANESTHESIA POSTPROCEDURE EVALUATION
Post-Op Assessment Note    CV Status:  Stable    Pain management: adequate      Post-op block assessment: catheter intact and no complications   Mental Status:  Alert and awake   Hydration Status:  Euvolemic   PONV Controlled:  Controlled   Airway Patency:  Patent     Post Op Vitals Reviewed: Yes    No anethesia notable event occurred.    Staff: Anesthesiologist           Last Filed PACU Vitals:  Vitals Value Taken Time   Temp     Pulse 72 03/01/25 1421   /61 03/01/25 1421   Resp     SpO2

## 2025-03-01 NOTE — PLAN OF CARE
Problem: BIRTH - VAGINAL/ SECTION  Goal: Fetal and maternal status remain reassuring during the birth process  Description: INTERVENTIONS:  - Monitor vital signs  - Monitor fetal heart rate  - Monitor uterine activity  - Monitor labor progression (vaginal delivery)  - DVT prophylaxis  - Antibiotic prophylaxis  3/1/2025 135 by Yanet Mcginnis RN  Outcome: Completed  3/1/2025 1053 by Yanet Mcginnis RN  Outcome: Progressing  Goal: Emotionally satisfying birthing experience for mother/fetus  Description: Interventions:  - Assess, plan, implement and evaluate the nursing care given to the patient in labor  - Advocate the philosophy that each childbirth experience is a unique experience and support the family's chosen level of involvement and control during the labor process   - Actively participate in both the patient's and family's teaching of the birth process  - Consider cultural, [place] and age-specific factors and plan care for the patient in labor  3/1/2025 1351 by Yanet Mcginnis RN  Outcome: Completed  3/1/2025 1053 by Yanet Mcginnis RN  Outcome: Progressing

## 2025-03-01 NOTE — L&D DELIVERY NOTE
Vaginal Delivery Summary - OB/GYN   Yvette Mcdonald 30 y.o. female MRN: [id number]  Unit/Bed#: -01 Encounter: [id number]      Pre-delivery Diagnosis:   1. Pregnancy at 38 weeks   2. Rubella non-immune  3. Normal labor and SROM  4. Anemia    Post-delivery Diagnosis: same, delivered    Procedure: Spontaneous Vaginal Delivery, repair of second degree perineal laceration     Attending: Rayna    Assistant(s): Ninoska    Anesthesia: Epidural    QBL: 100 mL    Complications: none apparent    Specimens:   1. Arterial and venous cord gases  2. Cord blood  3. Segment of umbilical cord  4. Placenta to storage     Findings:  1. Viable female on 3/2/2025 at 1235, with APGARS of 9 and 9 at 1 and 5 minutes respectively  2. Spontaneous delivery of intact placenta at 1240  3. 2 degree laceration repaired with 3-0 vicryl rapide  4. Blood gases:   Arterial pH: 7.311   Arterial base excess: -5.3   Venous insufficient sample to analyze     Disposition:  Patient tolerated the procedure well and was recovering in labor and delivery room       Brief history and labor course:  Ms. Yvette Mcdonald is a 30 y.o.  at 38w0d. She presented to labor and delivery for contractions and leaking fluid. Her pregnancy was complicated by the above listed diagnoses. On exam in triage she was noted to be grossly ruptured and 4/80/0. She was admitted for labor. She received an epidural for analgesia. She progressed to complete and started pushing.     Description of procedure:  After pushing for 23 minutes, at 1235 patient delivered a viable female , wt pending, apgars of 9 (1 min) and 9 (5 min). The fetal vertex delivered spontaneously. There was no nuchal cord. The anterior shoulder delivered atraumatically with maternal expulsive forces and the assistance of gentle downward traction. The posterior shoulder delivered with maternal expulsive forces and the assistance of gentle upward traction. The remainder of the fetus delivered spontaneously.      Upon delivery, the infant was placed on the mothers abdomen and the cord was clamped and cut after delayed cord clamping. The infant was noted to cry spontaneously and was moving all extremities appropriately. There was no evidence for injury. Awaiting nurse resuscitators evaluated the . Arterial and venous cord blood gases and cord blood was collected for analysis. These were promptly sent to the lab. In the immediate post-partum, 30 units of IV pitocin was administered, and the uterus was noted to contract down well with massage and pitocin. The placenta delivered spontaneously at 1240 and was noted to have a centrally inserted 3 vessel cord.     The vagina, cervix, perineum, and rectum were inspected and there was noted to be a second-degree perineal laceration that was repaired in the usual fashion with 3-0 Vicryl repeat..    Bimanual exam revealed minimal clots and good uterine tone.  The fundus was firm and at the level of the umbilicus.  At the conclusion of the procedure, all needle, sponge, and instrument counts were noted to be correct. Patient tolerated the procedure well and was allowed to recover in labor and delivery room with family and  before being transferred to the post-partum floor. Dr. Way was present and participated in all key portions of the case.      Ayala Xiao MD  3/2/2025  4:56 AM

## 2025-03-01 NOTE — ANESTHESIA PREPROCEDURE EVALUATION
Procedure:  LABOR ANALGESIA    Relevant Problems   ANESTHESIA (within normal limits)      CARDIO (within normal limits)      ENDO (within normal limits)      GI/HEPATIC (within normal limits)      /RENAL (within normal limits)      GYN   (+) 38 weeks gestation of pregnancy      HEMATOLOGY   (+) Anemia of mother in pregnancy, antepartum, third trimester   (+) Iron deficiency anemia      MUSCULOSKELETAL (within normal limits)      NEURO/PSYCH (within normal limits)      PULMONARY (within normal limits)        Physical Exam    Airway    Mallampati score: III  TM Distance: >3 FB  Neck ROM: full     Dental   No notable dental hx     Cardiovascular  Rhythm: regular, Rate: normal, Cardiovascular exam normal    Pulmonary  Pulmonary exam normal Breath sounds clear to auscultation    Other Findings  post-pubertal.      Anesthesia Plan  ASA Score- 2     Anesthesia Type- epidural with ASA Monitors.         Additional Monitors:     Airway Plan:            Plan Factors-Exercise tolerance (METS): >4 METS.    Chart reviewed.   Existing labs reviewed. Patient summary reviewed.    Patient is not a current smoker.              Induction-     Postoperative Plan-     Perioperative Resuscitation Plan - Level 1 - Full Code.       Informed Consent- Anesthetic plan and risks discussed with patient and spouse.        NPO Status:  No vitals data found for the desired time range.

## 2025-03-01 NOTE — OB LABOR/OXYTOCIN SAFETY PROGRESS
Labor Progress Note - Yvette Mcdonald 30 y.o. female MRN: [id number]    Unit/Bed#: -01 Encounter: [id number]       Contraction Frequency (minutes): 1-3  Contraction Intensity: Moderate  Uterine Activity Characteristics: Regular  Cervical Dilation: Lip/rim (Comment)        Cervical Effacement: 100  Fetal Station: 0  Baseline Rate (FHR): 130 bpm     FHR Category: II               Vital Signs:   Vitals:    03/01/25 1003   BP: 109/64   Pulse: 82   Resp:    Temp:    SpO2:        Notes/comments:   Patient is comfortable status post epidural.  FHT is mostly category I.  She had 1 small variable deceleration at 1038 but otherwise has moderate variability and accelerations.  She is cirilo every 1 to 3 minutes.  SVE as above.  Continue expectant management of normal labor.    Ayala Xiao MD 3/1/2025 10:43 AM

## 2025-03-01 NOTE — ANESTHESIA PROCEDURE NOTES
Epidural Block    Patient location during procedure: OB/L&D  Start time: 3/1/2025 8:31 AM  Reason for block: primary anesthetic  Staffing  Performed by: Jered Bhakta DO  Authorized by: Jered Bhakta DO    Preanesthetic Checklist  Completed: patient identified, IV checked, site marked, risks and benefits discussed, surgical consent, monitors and equipment checked, pre-op evaluation and timeout performed  Epidural  Patient position: sitting  Prep: Betadine  Sedation Level: no sedation  Patient monitoring: heart rate and frequent blood pressure checks  Approach: midline  Location: lumbar, L3-4  Injection technique: REBEKAH air  Needle  Needle type: Tuohy   Needle gauge: 18 G  Needle insertion depth: 5 cm  Catheter type: closed tip, multi-orifice and side hole  Catheter size: 20 G  Catheter at skin depth: 8 cm  Catheter securement method: clear occlusive dressing, stabilization device and tape  Test dose: negative  Assessment  Number of attempts: 1negative aspiration for CSF, negative aspiration for heme and no paresthesia on injection  patient tolerated the procedure well with no immediate complications

## 2025-03-01 NOTE — H&P
H & P- Obstetrics   Yvette Mcdonald 30 y.o. female MRN: [id number]  Unit/Bed#: -01 Encounter: [id number]    Assessment: 30 y.o.  at 38w0d admitted for spontaneous rupture of membranes.  SVE: 4/80/0  FHT: 130 bpm, moderate variability, 15 x 15 accelerations, no decelerations  Clinical EFW: 54% @ 32 weeks, 6.5 lbs by palpation on admission  ; Cephalic confirmed by TAUS  GBS status: negative    Postpartum contraception plan: declined    Plan:   Rubella non-immune status, antepartum  Assessment & Plan  Offer MMR post partum       Normal labor  Assessment & Plan  Expectant management of normal labor  SROM@0530 clear fluid    38 weeks gestation of pregnancy  Assessment & Plan  Admit to OBGYN   Clear liquid diet   F/u T&S, CBC, RPR   IVF LR 125cc/hr   Continuous fetal monitoring and tocometry   Analgesia at maternal request   Vertex by TAUS  Expectant management of normal labor      Anemia of mother in pregnancy, antepartum, third trimester  Assessment & Plan  Admission Hgb 12.2          Discussed case and plan w/ Dr. Santa       Chief Complaint: contractions and leaking fluid    HPI: Yvette Mcdonald is a 30 y.o.  with an SHWETHA of 3/15/2025, by Last Menstrual Period at 38w0d who is being admitted for labor and SROM. She complains of uterine contractions, occurring every every 2-3 minutes, has moderate LOF, and reports no VB. She states there is decreased FM.     Patient Active Problem List   Diagnosis    PCOS (polycystic ovarian syndrome)    Iron deficiency anemia    Vitamin D deficiency    Hypocalcemia    Anemia of mother in pregnancy, antepartum, third trimester    Lack of immunity to hepatitis B virus demonstrated by serologic test    38 weeks gestation of pregnancy    Normal labor    Rubella non-immune status, antepartum       Baby complications/comments: none    Review of Systems   Constitutional:  Negative for chills and fever.   HENT:  Negative for congestion, rhinorrhea, sneezing and sore throat.    Eyes:   Negative for photophobia and visual disturbance.   Respiratory:  Negative for cough and shortness of breath.    Cardiovascular:  Negative for chest pain.   Gastrointestinal:  Negative for diarrhea, nausea and vomiting.   Genitourinary:  Negative for dysuria and frequency.   Neurological:  Negative for dizziness, numbness and headaches.   Psychiatric/Behavioral: Negative.         OB Hx:  OB History    Para Term  AB Living   1 0 0 0 0 0   SAB IAB Ectopic Multiple Live Births   0 0 0 0 0      # Outcome Date GA Lbr Dante/2nd Weight Sex Type Anes PTL Lv   1 Current               Obstetric Comments   Menarche: -14      Menses: 28-32/5-7/maxi pad  every 6 hours x 2 days, then regular pad every 8-12 hours       Past Medical Hx:  Past Medical History:   Diagnosis Date    Chickenpox     Genetic screening 2024    HGB AA       Past Surgical hx:  Past Surgical History:   Procedure Laterality Date    NASAL RECONSTRUCTION      cosmetic       Social Hx:  Alcohol use: denies  Tobacco use: denies  Other substance use: denies    No Known Allergies      Medications Prior to Admission:     Cholecalciferol (VITAMIN D3) 1,000 units tablet    Prenatal Vit-Fe Fumarate-FA (PRENATAL VITAMIN PO)    Pyridoxine HCl (VITAMIN B6 PO)    Objective:  Temp:  [98.1 °F (36.7 °C)] 98.1 °F (36.7 °C)  HR:  [73] 73  BP: (124)/(71) 124/71  Resp:  [20] 20  SpO2:  [100 %] 100 %  Body mass index is 29.95 kg/m².     Physical Exam:  Physical Exam  Constitutional:       General: She is not in acute distress.     Appearance: Normal appearance. She is not ill-appearing.   Genitourinary:      Genitourinary Comments: SVE as above  Gross pooling of clear fluid, nitrazine positive    HENT:      Head: Normocephalic and atraumatic.      Mouth/Throat:      Mouth: Mucous membranes are moist. No oral lesions.   Eyes:      General: No scleral icterus.     Extraocular Movements: Extraocular movements intact.   Cardiovascular:      Rate and Rhythm: Normal rate  "and regular rhythm.      Pulses: Normal pulses.   Pulmonary:      Effort: Pulmonary effort is normal. No respiratory distress.   Abdominal:      Tenderness: There is no abdominal tenderness.      Comments: Gravid   Musculoskeletal:      Right lower leg: No edema.      Left lower leg: No edema.   Neurological:      General: No focal deficit present.      Mental Status: She is alert and oriented to person, place, and time.   Skin:     General: Skin is warm and dry.   Psychiatric:         Attention and Perception: Attention normal.         Mood and Affect: Mood normal.         Speech: Speech normal.         Behavior: Behavior normal.         Thought Content: Thought content normal.         Judgment: Judgment normal.   Vitals reviewed. Exam conducted with a chaperone present.            FHT:  Baseline Rate (FHR): 130 bpm  Variability: Minimal  Accelerations: 15 x 15 or greater, At variable times  Decelerations: None    TOCO:   Contraction Frequency (minutes): 0  Contraction Duration (seconds): 0  Contraction Intensity: Mild    Lab Results   Component Value Date    WBC 10.08 03/01/2025    HGB 12.2 03/01/2025    HCT 36.0 03/01/2025     03/01/2025     No results found for: \"NA\", \"K\", \"CL\", \"CO2\", \"BUN\", \"CREATININE\", \"GLUCOSE\", \"AST\", \"ALT\"  Prenatal Labs: Reviewed      Blood type: A pos  Antibody: neg  GBS: neg  HIV: NR  Rubella: non-immune  Syphilis IgM/IgG: NR  HBsAg: NR  HCAb: NR  Chlamydia: neg  Gonorrhea: neg  Diabetes 1 hour screen: wnl  3 hour glucose: n/a  Platelets: 165  Hgb: 12.2  >2 Midnights  INPATIENT     Signature/Title: Ayala Xiao MD  Date: 3/1/2025  Time: 8:25 AM   "

## 2025-03-01 NOTE — ADDENDUM NOTE
Addendum  created 03/01/25 1434 by Jered Bhakta DO    Attestation recorded in Intraprocedure, Flowsheet accepted, Intraprocedure Attestations filed

## 2025-03-01 NOTE — ASSESSMENT & PLAN NOTE
Lochia WNL   Recovering well   Appropriate bowel and bladder function   Pain well controlled   Tolerating diet   Breastfeeding   Ambulating without issues   No lower extremity tenderness  GBS neg   Rh pos

## 2025-03-01 NOTE — PLAN OF CARE

## 2025-03-02 VITALS
RESPIRATION RATE: 16 BRPM | WEIGHT: 180 LBS | BODY MASS INDEX: 29.99 KG/M2 | DIASTOLIC BLOOD PRESSURE: 68 MMHG | HEIGHT: 65 IN | HEART RATE: 63 BPM | OXYGEN SATURATION: 99 % | SYSTOLIC BLOOD PRESSURE: 134 MMHG | TEMPERATURE: 97.6 F

## 2025-03-02 LAB — TREPONEMA PALLIDUM IGG+IGM AB [PRESENCE] IN SERUM OR PLASMA BY IMMUNOASSAY: NORMAL

## 2025-03-02 PROCEDURE — 90707 MMR VACCINE SC: CPT

## 2025-03-02 PROCEDURE — 99024 POSTOP FOLLOW-UP VISIT: CPT | Performed by: OBSTETRICS & GYNECOLOGY

## 2025-03-02 PROCEDURE — NC001 PR NO CHARGE: Performed by: OBSTETRICS & GYNECOLOGY

## 2025-03-02 RX ORDER — ACETAMINOPHEN 325 MG/1
975 TABLET ORAL EVERY 6 HOURS
Qty: 60 TABLET | Refills: 0 | Status: SHIPPED | OUTPATIENT
Start: 2025-03-02 | End: 2025-03-07

## 2025-03-02 RX ORDER — SIMETHICONE 80 MG
80 TABLET,CHEWABLE ORAL 4 TIMES DAILY PRN
Start: 2025-03-02

## 2025-03-02 RX ORDER — IBUPROFEN 600 MG/1
600 TABLET, FILM COATED ORAL EVERY 6 HOURS
Qty: 20 TABLET | Refills: 0 | Status: SHIPPED | OUTPATIENT
Start: 2025-03-02 | End: 2025-03-07

## 2025-03-02 RX ORDER — BENZOCAINE/MENTHOL 6 MG-10 MG
1 LOZENGE MUCOUS MEMBRANE DAILY PRN
Start: 2025-03-02

## 2025-03-02 RX ADMIN — ACETAMINOPHEN 650 MG: 325 TABLET, FILM COATED ORAL at 13:50

## 2025-03-02 RX ADMIN — IBUPROFEN 600 MG: 600 TABLET, FILM COATED ORAL at 13:50

## 2025-03-02 RX ADMIN — MEASLES, MUMPS, AND RUBELLA VIRUS VACCINE LIVE 0.5 ML: 1000; 12500; 1000 INJECTION, POWDER, LYOPHILIZED, FOR SUSPENSION SUBCUTANEOUS at 19:04

## 2025-03-02 RX ADMIN — IBUPROFEN 600 MG: 600 TABLET, FILM COATED ORAL at 03:58

## 2025-03-02 RX ADMIN — ACETAMINOPHEN 975 MG: 325 TABLET, FILM COATED ORAL at 03:58

## 2025-03-02 NOTE — LACTATION NOTE
This note was copied from a baby's chart.  CONSULT - LACTATION  Baby Girl (Roxane Mcdonald 1 days female MRN: [id number]    [facility] NURSERY Room / Bed: (N)/(N) Encounter: [id number]    Maternal Information     MOTHER:  Yvette Mcdonald  Maternal Age: 30 y.o.  OB History: # 1 - Date: 03/01/25, Sex: Female, Weight: 3120 g (6 lb 14.1 oz), GA: 38w0d, Type: Vaginal, Spontaneous, Apgar1: 9, Apgar5: 9, Living: Living, Birth Comments: None   Previous breast reduction surgery? No    Lactation history:   Has patient previously breast fed: No   How long had patient previously breast fed:     Previous breast feeding complications:       Past Surgical History:   Procedure Laterality Date    NASAL RECONSTRUCTION      cosmetic       Birth information:  YOB: 2025   Time of birth: 12:35 PM   Sex: female   Delivery type: Vaginal, Spontaneous   Birth Weight: 3120 g (6 lb 14.1 oz)   Percent of Weight Change: -1%     Gestational Age: 38w0d   [unfilled]    Reason for Consult:    Reason for Consult: Initial assessment (ext) - 20 min, Latch Assess (ext) - 20 min, Supplementation (routine) - 10 min, Discharge (ext) - 20 min    Risk Factors:    Risk Factors: Language barrier (Nepalese)    Breast and nipple assessment:   Breasts/Nipples  Left Breast: Soft  Right Breast: Soft  Left Nipple: Tender, Everted  Right Nipple: Tender, Everted  Intervention: Hand expression, Lanolin  Breastfeeding Status: Yes  Breastfeeding Progress: Not yet established, Other issues (comment) (anxious mom; desires to breastfeed; feeding large volume of formula)  Reasons for not Breastfeeding: Maternal preference (belief that first milk is not enough)    OB Lactation Tools:    Other OB Lactation Tools  Feeding Devices: Lanolin, Bottle    Breast Pump:    Breast Pump  Pump: Personal (zomee pump order sent)  Pump Review/Education: Setup, frequency, and cleaning, Milk storage, Other (Comment) (cycle and hands on  pumping)       Assessment:  sleepy after large volume feeding of formula    Feeding assessment: latch difficulty (due to positioning and mom believing that colostrum is not beneficial to baby)  LATCH:  Latch: Repeated attempts, hold nipple in mouth, stimulate to suck   Audible Swallowing: None   Type of Nipple: Everted (After stimulation)   Comfort (Breast/Nipple): Soft/non-tender (shallow latch)   Hold (Positioning): Full assist, staff holds infant at breast   LATCH Score: 5       HazelBaker:    Appearance Items  Appearance of tongue when lifted: Round or square  Elasticity of frenulum: Moderately elastic  Length of lingual frenulum when tongue lifted: Round or square  Attachment of lingual frenulum to tongue: Posterior to tip    Function Items  Lift of tongue: Tip to mid-mouth  Extension of tongue: Tip over lower gum only  Spread of anterior tongue: Complete  Cupping: Entire edge, firm cup  Peristalsis: Complete, anterior to posterior  Snapback: None         Feeding recommendations:  breast feed on demand. If mom is anxious or believes that baby is not satisfied, provided a mixed feeding plan.     FOB denies  and states he will interpret.    Provided RSB in Malay.     Provided D/C in english.     Had Yvette download the Global health media anne marie.     Assisted with latching attempt on both breasts in many positions.       Mom:  Breast: medium, symmetrical, pendulous  Nipple Assessment in General: Normal: elongated/eraser, no discoloration and no damage noted.  Mother's Awareness of Feeding Cues                 Recognizes: Yes                  Verbalizes: Yes  Support System: FOB  History of Breastfeeding: first child       Latch After Lactation Education/Consult:  Efficiency: cradle / cross cradle/ side lying              Lips Flanged: mom prefers cradle; ed. On how align nipple to nose. Enc. Snug hold of baby and compression between the shoulders              Depth of latch: deeper with  "alignment              Audible Swallow: No              Visible Milk: No              Wide Open/ Asymmetrical: better with hand over hand demonstration and teach back              Suck Swallow Cycle: Breathing: yes, Coordinated: no  Nipple Assessment after latch: Normal: elongated/eraser, no discoloration and no damage noted.  Latch Problems: alignment, angled neck; baby is turned away from the breast; baby is not supported at the breast. If mom achieves a latch, nipple was sucked into her mouth; mom complains of painful latch and nipple pain. Baby loses the latch when she swallows. Mom is anxious about baby breathes at the breast. With encouragement, Yvette tried the side lying and cross cradle to latch and cradle to support. Anna provided a few sucks before she lost the nipple.     Ed. On offering artificial nipples to early or pacifier use.     Feed expressed milk or formula as needed/desired. Paced bottle feeding technique is less stressful for your baby, prevents overfeeding and protects the breastfeeding relationship.  You can find a video about paced bottle feeding at www.lacted.org or MilkMob on YouTPlumTV.    Education on non-nutritive suck, how the use of pacifier affecting feeds, shallow latch due to pacifier use, and signs of satiation on the breast. Encouraged offering both breasts at least once, up to two times in a feeding session.     Education on creating a snug hold of your infant to the breast by verifying the infant's cheek is touching the breast, your infant's chin is deep into the breast tissue, your infant's arms are \"hugging\" the breast, and your infant's lips are flanged on the areola. Bring infant to the breast, not your breast to the infant. Latch should feel like a tugging sensation on the nipple.    Mom is encouraged to place baby skin to skin for feedings. Skin to skin education provided for baby placement on mother's chest, baby only in diaper, blankets below shoulders on baby's back. Skin " "to skin is encouraged to continue at home for feedings and between feedings.    All babies are born to breastfeed due to upturned nose and flared nostrils. Mom will always see tip of nose. Babies will unlatch when they can't breathe.       Position After Lactation Education/Consult:  Infant's Ergonomics/Body               Body Alignment: Yes, with lactation demonstrating               Head Supported: No               Close to Mom's body/ Lifted/ Supported: Yes, with lactation support               Mom's Ergonomics/Body: Yes, with ed/ demonstration                           Supported: Yes, more pillows provided                           Sitting Back: Yes                           Brings Baby to her breast: No, takes breast to baby. Then lets go of the breast and baby loses the latch.  Positioning Problems: alignment; supporting Anna up to the breast; Supporting the breast so Yvette can see the latch. Large volume feed of formula prior to latching.     Feeding Plan:     Nurse on demand: when baby gives hunger cues; when your breasts feel full, or at least every 3 hours during the day and every 5 hours at night counting from the beginning of one feeding to the beginning of the next; which ever comes first. When sucking and swallowing slow, gently compress the breast to restart flow. If active suck-swallow does not restart, gently remove the baby and offer the other breast; offering up to \"four\" breasts per feeding.    Education on positioning and alignment. Mom is encouraged to:     - Bring baby up to the breast (use of pillows to elevate so baby's torso is against mom's breasts)   - Skin to skin for feedings with top hand exposed to show signs of satiation   - Chin deep into breast tissue (make baby look up to the nipple)   - nose aligned to the nipple   -Wait for wide gape, place chin behind the areola on the breast so nipple is at the nose. Once baby opens their mouth wide, the nipple will be aimed at the upper, back " "palate  - Cheeks should be touching breast, and baby should have a long neck   - Ear, shoulder, hip will be in alignment   - Deep, snug hold of baby up to the breast   - Every baby knows how to breathe at the breast. The tip of the nose may appear to touch the breast. Babies breathe from the side of the nasal passages. If baby can not breathe due to improper alignment, baby will unlatch    Education on creating a snug hold of your infant to the breast by verifying the infant's cheek is touching the breast, your infant's chin is deep into the breast tissue, your infant's arms are \"hugging\" the breast, and your infant's lips are flanged on the areola. Bring infant to the breast, not your breast to the infant. Latch should feel like a tugging sensation on the nipple.    All babies are born to breastfeed due to upturned nose and flared nostrils. Mom will always see tip of nose. Babies will unlatch when they can't breathe.     Provided demonstration, education and support of deep latch to breast by placing the nipple to the nose, dragging down to chin to achieve a wide latch. Bring baby to the breast, not breast to baby. Move your shoulders down and away from your ears. Look for ear, shoulder, hip alignment. Baby's upper and lower lip should be flanged on the breast.    Demonstrated with teach back breast compressions during a feeding to increase milk transfer and stimulate suckling after a breathing/muscle break.     Mix Feeds:   Start every feeding at the breast. Offer both breasts or one breast and use breast compressions to achieve active suckling. Once baby is not actively suckling, bring baby in upright position and offer expressed milk and/or artificial supplementation via alternative feeding method (syringe, finger, paced bottle feeding). Burp frequently between breasts and during paced bottle feeding. Once feed is complete, place baby back on breast for on-nutritive suck. Pump after the feeding session to " supplement with expressed milk at next feed.    Discharge feeding plan    1. Meet early feeding cues  2. Use massage, warmth, hand expression to stimulate breasts  3. Bring baby to breast skin to skin  4. Align nipple to nose, place baby's chin behind the areola, (move baby not breast) and bring baby to breast when mouth is wide and deep latch is achieved. (Scan QR code for [employer] - positions)  5. Use breast compressions to stimulate suck  6. Allow baby to stay on the breast for up to 30 min.   7. Look for signs of satiation  8. Once baby unlatches, use burping techniques  9. Use massage, warmth, hand expression on the second breast  10. Bring baby to the 2nd breast to finish the feeding  11. Follow Step #4 to achieve a deep latch and alignment  12. You may offer each breast up to two times in a breastfeeding session    (Scan QR code for [employer] - positions)     [employer] - positions      If baby does not meet diaper output  1. If baby does not suck with stimulation, becomes fussy, or un-latches, use breast pump to express milk.   2.  Feed expressed milk via paced bottle feeding method. Review Milkmob on youAcumenube or scan QR code for MilkMob video  3. Bring baby back to opposite breast for non-nutritive suck and skin to skin  4. Pump after each feed to stimulate breasts and have expressed milk for next feed       Milk Mob     Feed expressed milk or formula as needed/desired. Paced bottle feeding technique is less stressful for your baby, prevents overfeeding and protects the breastfeeding relationship.  You can find a video about paced bottle feeding at www.lacted.org or MilkMob on YouTube.     Zomee pump  Begin pumping at Level 1 with suction low. When mom sees milk in the tunnel,   Change to            Level 3 with suction high. When mom doesn't see milk,   Change to            Level 2 with suction either High or Low.  When mom sees milk,   Change to             Level 3 again. Do this at least 3 x in a pumping session     Enc. To end all pumping sessions with hand pump and feel for full glands.      Christy Hume, MA 3/2/2025 4:35 PM

## 2025-03-02 NOTE — DISCHARGE SUMMARY
Discharge Summary - OB/GYN  Yvette Mcdonald 30 y.o. female MRN: [id number]  Unit/Bed#: -01 Encounter: [id number]    Admission Date: 3/1/2025     Discharge Date: 3/2/2025    Admitting Attending: Cecelia Way MD    Delivering Attending: Rayna    Discharging Attending: Serafin    Principal Diagnosis: Pregnancy at 38w0d    Secondary Diagnosis:   1. Rubella non-immune  2. Normal labor and SROM  3. Anemia     Procedures: spontaneous vaginal delivery, repair of second degree perineal laceration     Anesthesia: epidural    Hospital course:   Ms. Yvette Mcdonald is a 30 y.o.  at 38w0d. She presented to labor and delivery for contractions and leaking fluid. Her pregnancy was complicated by the above listed diagnoses. On exam in triage she was noted to be grossly ruptured and 4/80/0. She was admitted for labor. She received an epidural for analgesia. She progressed to complete and started pushing.     She delivered a viable female  on 3/1/2025 at 2335. Weight 6lbs 14oz via normal spontaneous vaginal delivery. She sustained a second-degree perineal laceration during delivery which was adequately repaired. Apgars were 9 (1 min) and 9 (5 min).  was transferred to  nursery. Patient tolerated the procedure well.     Her post-delivery course was uncomplicated. Her postpartum pain was well controlled with oral analgesics.    On day of discharge, she was ambulating and able to reasonably perform all ADLs. She was voiding and had appropriate bowel function. Pain was well controlled. She was discharged home on postpartum day #1 without complications. Patient was instructed to follow up with her OB as an outpatient and was given appropriate warnings to call provider if she develops signs of infection or uncontrolled pain.    Complications: none apparent    Condition at discharge: good     Discharge instructions/Information to patient and family:   See after visit summary for information provided to patient  and family.      Provisions for Follow-Up Care:  See after visit summary for information related to follow-up care and any pertinent home health orders.      Disposition: See After Visit Summary for discharge disposition information.    Planned Readmission: No    Discharge medications and instructions:   Please see AVS for full list of medications upon discharge.    Lakesha Betancourt MD  3/2/2025  9:47 AM

## 2025-03-02 NOTE — PROGRESS NOTES
Discharge Teaching packet reviewed with patient and FOB using  Bright ([id number]).  Verbalized understanding with no further questions at this time.

## 2025-03-02 NOTE — PROGRESS NOTES
"Progress Note - OB/GYN   Name: Yvette Mcdonald 30 y.o. female I MRN: [id number]  Unit/Bed#:  410-01 I Date of Admission: 3/1/2025   Date of Service: 3/2/2025 I Hospital Day: 1     Assessment & Plan   (spontaneous vaginal delivery)  Lochia WNL   Recovering well   Appropriate bowel and bladder function   Pain well controlled   Tolerating diet   Breastfeeding   Ambulating without issues   No lower extremity tenderness  GBS neg   Rh pos    Rubella non-immune status, antepartum  Offer MMR post partum         Disposition    - Anticipate discharge home on PPD# 1      Subjective/Objective     Chief Complaint: Postpartum State     Subjective:    Yvette Mcdonald is PPD#1 s/p  spontaneous vaginal delivery. She has no current complaints.  Pain is well controlled.  Patient is currently voiding.  She is ambulating.  Patient is currently passing flatus and has had no bowel movement. She is tolerating PO, and denies nausea or vomitting. Patient denies fever, chills, chest pain, shortness of breath, or calf tenderness. Lochia is normal. She is  Breastfeeding. She is recovering well and is stable.       Vitals:   /74 (BP Location: Left arm)   Pulse 67   Temp 97.6 °F (36.4 °C) (Oral)   Resp 12   Ht 5' 5\" (1.651 m)   Wt 81.6 kg (180 lb)   LMP 2024 (Exact Date)   SpO2 97%   Breastfeeding Yes   BMI 29.95 kg/m²       Intake/Output Summary (Last 24 hours) at 3/2/2025 0831  Last data filed at 3/1/2025 2030  Gross per 24 hour   Intake 1180.55 ml   Output 700 ml   Net 480.55 ml       Invasive Devices       None                   Physical Exam:   GEN: Yvette Mcdonald appears well, alert and oriented x 3, pleasant and cooperative   CARDIO: RRR, no murmurs or rubs  RESP:  CTAB, no wheezes or rales  ABDOMEN: soft, no tenderness, no distention, fundus firm below umbilicus  EXTREMITIES: non tender, no erythema, b/l Lily's sign negative      Labs:     Hemoglobin   Date Value Ref Range Status   2025 12.2 11.5 - 15.4 g/dL Final "   12/12/2024 10.9 (L) 11.5 - 15.4 g/dL Final     WBC   Date Value Ref Range Status   03/01/2025 10.08 4.31 - 10.16 Thousand/uL Final   12/12/2024 9.20 4.31 - 10.16 Thousand/uL Final     Platelets   Date Value Ref Range Status   03/01/2025 165 149 - 390 Thousands/uL Final     Comment:     This is an appended report.  These results have been appended to a previously preliminary verified report.   12/12/2024 231 149 - 390 Thousands/uL Final          Ayala Xiao MD  3/2/2025  8:31 AM

## 2025-03-03 ENCOUNTER — PATIENT MESSAGE (OUTPATIENT)
Dept: OBGYN CLINIC | Facility: CLINIC | Age: 30
End: 2025-03-03

## 2025-03-03 LAB
DME PARACHUTE DELIVERY DATE ACTUAL: NORMAL
DME PARACHUTE DELIVERY DATE REQUESTED: NORMAL
DME PARACHUTE ITEM DESCRIPTION: NORMAL
DME PARACHUTE ORDER STATUS: NORMAL
DME PARACHUTE SUPPLIER NAME: NORMAL
DME PARACHUTE SUPPLIER PHONE: NORMAL

## 2025-03-03 NOTE — NURSING NOTE
Education and discharge paperwork completed with Interpretor Alonso Granda [id number]. All questions and concerns from patient or spouse were reviewed and answered at this time.

## 2025-03-03 NOTE — UTILIZATION REVIEW
"NOTIFICATION OF INPATIENT ADMISSION   MATERNITY/DELIVERY AUTHORIZATION REQUEST   SERVICING FACILITY:   [facility]  Parent Child Health - L&D, [place], NICU  [address] [place] [address]  Tax ID: [id number]  NPI: [id number] ATTENDING PROVIDER:  Attending Name and NPI#: Cecelia Way Md [[id number]]  Address: [address] [place] [address]  Phone: [phone]     ADMISSION INFORMATION:  Place of Service: Inpatient [facility]  Place of Service Code: 21  Inpatient Admission Date/Time: 3/1/25  7:20 AM  Discharge Date/Time: 3/2/2025  7:45 PM  Admitting Diagnosis Code/Description:  No admission diagnoses are documented for this encounter.   Mother: Yvette Mcdonald 1995 Estimated Date of Delivery: 3/15/25  Delivering clinician: Cecelia Way   OB History          1    Para   1    Term   1       0    AB   0    Living   1         SAB   0    IAB   0    Ectopic   0    Multiple   0    Live Births   1           Obstetric Comments   Menarche: 13-14    Menses: 28-32/5-7/maxi pad  every 6 hours x 2 days, then regular pad every 8-12 hours             [place] Name & MRN:   Information for the patient's :  Anna Sanchez Enrique [[id number]]   [place] Delivery Information:  Sex: female  Delivered 3/1/2025 12:35 PM by Vaginal, Spontaneous; Gestational Age: 38w0d    [place] Measurements:  Weight: 6 lb 14.1 oz (3120 g);  Height: 20.5\"    APGAR 1 minute 5 minutes 10 minutes   Totals: 9 9       UTILIZATION REVIEW CONTACT:  Jacqueline Gonzalez, Utilization   Network Utilization Review Department  Phone: [phone]  Fax [phone]  Email: [email or web address]  Contact for approvals/pending authorizations, clinical reviews, and discharge.   PHYSICIAN ADVISORY SERVICES:  Medical Necessity Denial & Zpok-wv-Sspd Review  Phone: [phone]  Fax: [phone]  Email: [email or web address]   DISCHARGE SUPPORT TEAM:  For Patients " Discharge Needs & Updates  Phone: [phone] opt. 2 Fax: [phone]  Email: [email or web address]

## 2025-03-04 ENCOUNTER — TELEPHONE (OUTPATIENT)
Dept: OBGYN CLINIC | Facility: CLINIC | Age: 30
End: 2025-03-04

## 2025-03-04 NOTE — TELEPHONE ENCOUNTER
----- Message from Lakesha Betancourt MD sent at 3/2/2025 10:11 AM EST -----  Regarding: appt  Please schedule patient for postpartum visit in about 3 weeks.    Thanks!  Lakesha

## 2025-03-10 LAB — PLACENTA IN STORAGE: NORMAL

## 2025-03-11 ENCOUNTER — TELEPHONE (OUTPATIENT)
Dept: OBGYN CLINIC | Facility: CLINIC | Age: 30
End: 2025-03-11

## 2025-03-11 NOTE — TELEPHONE ENCOUNTER
Called patient for postpartum follow up call.   Unable to leave message on voicemail due to voicemail not being set up.

## 2025-03-15 DIAGNOSIS — E55.9 VITAMIN D DEFICIENCY: ICD-10-CM

## 2025-03-17 RX ORDER — CHOLECALCIFEROL (VITAMIN D3) 25 MCG
TABLET ORAL
Qty: 60 TABLET | Refills: 0 | Status: SHIPPED | OUTPATIENT
Start: 2025-03-17

## 2025-03-17 NOTE — PATIENT COMMUNICATION
FYI - Patient's spouse returned call to schedule PP appt. Pt declined 3/31 but agreeable to 4/1 with Dr Guaman.

## 2025-03-19 NOTE — TELEPHONE ENCOUNTER
POSTPARTUM PHONE CALL ASSESSMENT    Date of Delivery: 3/1/2025  Delivering Provider: Dr. Way   Mode:   Delivery Notes/Complications: No complications    Do you still have bleeding?  Yes, light bleeding  Are you experiencing pain? No, denies pain   If yes, managing pain with:   Regular BMs/Urination? Yes  Breastfeeding/Formula/Both? Breast and bottle feeding  How are you doing emotionally? Patient states she is doing well.     Do you have any other questions or concerns for us or your provider? No     Have you scheduled the pediatrician appointment with pediatrician? Yes    Postpartum Appointment  Appointment Date: 2025   Provider:  Dr. Guaman

## 2025-03-20 ENCOUNTER — OFFICE VISIT (OUTPATIENT)
Dept: POSTPARTUM | Facility: CLINIC | Age: 30
End: 2025-03-20
Payer: COMMERCIAL

## 2025-03-20 PROCEDURE — 99404 PREV MED CNSL INDIV APPRX 60: CPT | Performed by: PEDIATRICS

## 2025-03-20 NOTE — PATIENT INSTRUCTIONS
"-Continue to nurse Yvette on demand, offering up to four breasts per feeding depending on her cues.   -Pump if not offering her the breast for a feeding, in order to protect your milk supply.  --Cycle pumping : high speed, low suction until you see your milk flow, then switch to a lower speed and higher suction to express the milk efficiently. Continue to gradually increase suction and decrease speed throughout the session. You may cycle back into \"massage mode\" to stimulate another letdown when you see your milk flow slow within the pumping session or at the end of the pumping session.   -Always use the lowest effective suction when pumping, higher suction will not lead to more milk if it is uncomfortable for you and causing breast tissue to pull into the flange.   -Your nipples measured today at 13 mm on each side. You likely need a much smaller flange to pump effectively.   -Wear a supportive, but not tight, bra. Sit comfortably reclined when nursing or pumping, and throughout the day when possible.    -Follow up in one week for ongoing nursing and pumping support.   "

## 2025-03-20 NOTE — PROGRESS NOTES
INITIAL BREAST FEEDING EVALUATION    Informant/Relationship: Yvette (mom/self)     Discussion of General Lactation Issues: Yvette is breastfeeding but she seems hungry after nursing. She is not content until getting a bottle after. She has tried using a breast pump but can only collect 1 oz, this is after a feeding or in place of nursing. Mom states she is very tired at night and is only offering Anna formula at that time. She is not pumping at that time.     Infant, Anna, is 2 weeks old today.     Appt assisted with  [id number]     History:  Fertility Problem:no  Breast changes:yes - enlargement, more full and firm   : yes - water broke, went to the hospital, baby was born in 6 hours, no interventions.   Full term:yes - 38 weeks    labor:no  First nursing/attempt < 1 hour after birth:yes - she latched on her own and did well   Skin to skin following delivery:yes - immediately   Breast changes after delivery:yes - about 10 days   Rooming in (infant in room with mother with exception of procedures, eg. Circumcision: yes - entire   Blood sugar issues:no  NICU stay:no  Jaundice:no  Phototherapy:no  Supplement given: (list supplement and method used as well as reason(s):yes - Similac, baby was crying all the time, didn't seem to get full     Past Medical History:   Diagnosis Date    Chickenpox     Genetic screening 2024    HGB AA         Current Outpatient Medications:     benzocaine-menthol-lanolin-aloe (DERMOPLAST) 20-0.5 % topical spray, Apply 1 Application topically every 6 (six) hours as needed for mild pain or irritation, Disp: , Rfl:     cholecalciferol (VITAMIN D3) 1,000 units tablet, TAKE 2 TABLETS (2,000 UNITS TOTAL) BY MOUTH DAILY, Disp: 60 tablet, Rfl: 0    hydrocortisone 1 % cream, Apply 1 Application topically daily as needed for irritation or rash, Disp: , Rfl:     ibuprofen (MOTRIN) 600 mg tablet, Take 1 tablet (600 mg total) by mouth every 6 (six) hours for 5 days, Disp:  20 tablet, Rfl: 0    Prenatal Vit-Fe Fumarate-FA (PRENATAL VITAMIN PO), Take by mouth, Disp: , Rfl:     Pyridoxine HCl (VITAMIN B6 PO), Take by mouth, Disp: , Rfl:     simethicone (MYLICON) 80 mg chewable tablet, Chew 1 tablet (80 mg total) 4 (four) times a day as needed for flatulence, Disp: , Rfl:     witch hazel-glycerin (TUCKS) topical pad, Apply 1 Pad topically every 4 (four) hours as needed for irritation, Disp: , Rfl:     No Known Allergies    Social History     Substance and Sexual Activity   Drug Use Never       Social History     Interval Breastfeeding History:    Frequency of breast feeding: 10 times in 24 hours   Does mother feel breastfeeding is effective: If no, explain: sometimes feel she is not swallowing   Does infant appear satisfied after nursing:No - usually not, only happened one time   Stooling pattern normal: Yes  Urinating frequently:Yes  Using shield or shells: No    Alternative/Artificial Feedings:   Bottle: Yes, using different kind, is drinking quickly depending on how hungry she is   Cup: No  Syringe/Finger: No           Formula Type: Similac 360                      Amount: 2-3 ounces             Breast Milk:                      Amount: only directly from the breast             Frequency Q 2 Hr between feedings  Elimination Problems: No      Equipment:  Nipple Shield             Type: none  Pump            Type: Zomee             Frequency of Use: trying to do it once per day,     Equipment Problems: yes, breasts hurt after she is finished pumping     Mom:  Breast: Normal, rounded, full feeling glandular tissue bilaterally, asymmetrical   Nipple Assessment in General: Normal: elongated/eraser, no discoloration and no damage noted.  Mother's Awareness of Feeding Cues                 Recognizes: Yes                  Verbalizes: Yes  Support System:   History of Breastfeeding: first time breastfeeding   Changes/Stressors/Violence: baby does not seem satisfied at the breast    Concerns/Goals: Ensure ziggy Castillo is getting what she needs from nursing.     Problems with Mom: none     Physical Exam  Constitutional:       Appearance: Normal appearance.   HENT:      Head: Normocephalic.   Pulmonary:      Effort: Pulmonary effort is normal.   Musculoskeletal:         General: Normal range of motion.      Cervical back: Normal range of motion.   Neurological:      General: No focal deficit present.      Mental Status: She is alert and oriented to person, place, and time.   Skin:     General: Skin is warm.      Capillary Refill: Capillary refill takes less than 2 seconds.   Psychiatric:         Mood and Affect: Mood normal.         Behavior: Behavior normal.       Infant:  Behaviors: Alert  Color: Pink  Birth weight: 3120 g   Current weight: 3755 g     Problems with infant: none       General Appearance:  Alert, active, no distress                            Head:  Normocephalic, AFOF, sutures opposed                            Eyes:   Conjunctiva clear, no drainage                            Ears:   Normally placed, no anomolies                           Nose:   Septum intact, no drainage or erythema                          Mouth:  No lesions. Tongue is at rest at her palate, flat when crying. Body of tongue lateralizes, tip follows with encouragement. Full cup on gloved finger, mostly compression felt when sucking. Tongue maintains full contact when gentle pressure placed on mandible. Manual lift shows rounded tongue tip and frenulum connected at the floor of the mouth and mid tongue.                    Neck:  Supple, symmetrical, trachea midline                Respiratory:  No grunting, flaring, retractions, breath sounds clear and equal           Cardiovascular:  Regular rate and rhythm. No murmur. Adequate perfusion/capillary refill. Femoral pulse present                  Abdomen:    Soft, non-tender, no masses, bowel sounds present, no HSM            Genitourinary:  Normal female  genitalia, anus patent                         Spine:   No abnormalities noted       Musculoskeletal:   Full range of motion         Skin/Hair/Nails:   Skin warm, dry, and intact, no rashes or abnormal dyspigmentation or lesions               Neurologic:   No abnormal movement, tone appropriate for gestational age    [place] Latch:  Efficiency:               Lips Flanged: Yes, with some adjustments at times               Depth of latch: wide               Audible Swallow: Yes              Visible Milk: Yes              Wide Open/ Asymmetrical: Yes              Suck Swallow Cycle: Breathing: yes, Coordinated: yes  Nipple Assessment after latch: Normal: elongated/eraser, no discoloration and no damage noted.  Latch Problems: None today. She has a wide attachment with positioned properly, if she is shallowly attached, her lips are easily adjusted to flange out. Mom denies pain. Baby actively drinks - slows down after a few minutes    Position:  Infant's Ergonomics/Body               Body Alignment: initially baby's body was facing up, head turned toward the breast.                Head Supported: Yes               Close to Mom's body/ Lifted/ Supported: Yes               Mom's Ergonomics/Body: Yes                           Supported: Yes                           Sitting Back: No                           Brings Baby to her breast: No  Positioning Problems: Reviewed proper alignment and head angle. Encouraged Mom to sit back comfortably and bring baby to the breast. She is receptive to teaching. We added pillow support under Mom's arm per her request.     Discussion of pumping: Yvette reports that pumping is painful for her - she is using the highest suction because she believes she will not get any milk if she does not pump this way, using the 21 mm flange.     Education:  Reviewed Latch: importance of deep latch without pain.   Reviewed Positioning for Dyad: proper alignment and head angle when positioning at the breast    Reviewed Frequency/Supply & Demand: offer the breast at each feeding, pump if baby is not latching and effective transferring milk.   Reviewed Infant:Cues and varied States of Awareness: watch for hunger cues, feed on demand. If baby seems satisfied at the breast (calm, relaxed sleeping, breasts are softer) no need to pump or supplement   Reviewed Infant Elimination: goal of 6+ wets and 2-3 stools per day   Reviewed Alternative/Artificial Feedings: paced bottle feeding technique demonstrated  Reviewed Mom/Breast care: gentle handling of the breast at all times,as well as tips for healing sore nipples.    Reviewed Equipment: Hand pump and electric pump general guidance, Discussed proper flange fit, how to measure        Plan:      Reassurance provided that baby is growing well at this time. Cont with positioning adjustments and watch for signs of effective feeding on the breast. Pump if wanting to replace feeding at the breast with bottle feeding to protect supply.  Obtain smaller flange, use gentler suction when pumping. Using lowest effective suction always when pumping. Gentle handling of the breast at all times to preserve integrity.  Contact [facility] for breastfeeding support as needed or ongoing concerns with latching comfort and milk transfer.  Follow up recommended in one week, bring pump to visit for more hands on pumping support.     I have spent 90 minutes with Patient and family today in which greater than 50% of this time was spent in counseling/coordination of care regarding Patient and family education.

## 2025-03-22 NOTE — PROGRESS NOTES
I have reviewed the notes, assessments, and/or procedures performed by Kyara Qureshi RN, IBCLC, I concur with her/his documentation of Yvette Cole MD 03/22/25

## 2025-04-01 ENCOUNTER — POSTPARTUM VISIT (OUTPATIENT)
Dept: OBGYN CLINIC | Facility: CLINIC | Age: 30
End: 2025-04-01

## 2025-04-01 VITALS
WEIGHT: 155 LBS | BODY MASS INDEX: 25.83 KG/M2 | SYSTOLIC BLOOD PRESSURE: 106 MMHG | DIASTOLIC BLOOD PRESSURE: 62 MMHG | HEIGHT: 65 IN

## 2025-04-01 DIAGNOSIS — Z30.41 ORAL CONTRACEPTIVE PILL SURVEILLANCE: ICD-10-CM

## 2025-04-01 PROBLEM — Z28.39 RUBELLA NON-IMMUNE STATUS, ANTEPARTUM: Status: RESOLVED | Noted: 2025-03-01 | Resolved: 2025-04-01

## 2025-04-01 PROBLEM — O09.899 RUBELLA NON-IMMUNE STATUS, ANTEPARTUM: Status: RESOLVED | Noted: 2025-03-01 | Resolved: 2025-04-01

## 2025-04-01 PROCEDURE — 99024 POSTOP FOLLOW-UP VISIT: CPT | Performed by: OBSTETRICS & GYNECOLOGY

## 2025-04-01 RX ORDER — ACETAMINOPHEN AND CODEINE PHOSPHATE 120; 12 MG/5ML; MG/5ML
1 SOLUTION ORAL DAILY
Qty: 84 TABLET | Refills: 1 | Status: SHIPPED | OUTPATIENT
Start: 2025-04-01

## 2025-04-01 NOTE — PROGRESS NOTES
Patient is a 30 y.o.  with Patient's last menstrual period was 2024 (exact date). who presents for post partum examination s/p  on 3/1/2025.     Pt is without complaints. She reports overall she is feeling well.    She reports her lochia consists of spotting.     Pt reports she is Breast and bottle feeding.    Pt denies si/sx of ppd and scored a 3 on her ppd-E today    Pt reports she has not been sexually active as of yet. She desires progesterone only pill for contraception      Past Medical History:   Diagnosis Date    Chickenpox     Genetic screening 2024    HGB AA    Rubella non-immune status, antepartum 2025    Got vaccine post partum         Past Surgical History:   Procedure Laterality Date    NASAL RECONSTRUCTION      cosmetic       OB History    Para Term  AB Living   1 1 1 0 0 1   SAB IAB Ectopic Multiple Live Births   0 0 0 0 1      # Outcome Date GA Lbr Dante/2nd Weight Sex Type Anes PTL Lv   1 Term 25 38w0d / 00:31 3120 g (6 lb 14.1 oz) F Vag-Spont EPI N PARAG      Obstetric Comments   Menarche: 13-14      Menses: 28-32/5-7/maxi pad  every 6 hours x 2 days, then regular pad every 8-12 hours             Current Outpatient Medications:     cholecalciferol (VITAMIN D3) 1,000 units tablet, TAKE 2 TABLETS (2,000 UNITS TOTAL) BY MOUTH DAILY, Disp: 60 tablet, Rfl: 0    norethindrone (Ortho Micronor) 0.35 MG tablet, Take 1 tablet (0.35 mg total) by mouth daily, Disp: 84 tablet, Rfl: 1    Prenatal Vit-Fe Fumarate-FA (PRENATAL VITAMIN PO), Take by mouth, Disp: , Rfl:     No Known Allergies    Social History     Socioeconomic History    Marital status: /Civil Union     Spouse name: Alfredo    Number of children: 0    Years of education: None    Highest education level: Bachelor's degree (e.g., BA, AB, BS)   Occupational History    Occupation:    Tobacco Use    Smoking status: Never    Smokeless tobacco: Never   Vaping Use    Vaping status: Former     Substances: Nicotine, Flavoring   Substance and Sexual Activity    Alcohol use: Not Currently    Drug use: Never    Sexual activity: Yes     Partners: Male     Birth control/protection: None     Comment: lifetime partners: 1   Other Topics Concern    None   Social History Narrative    Baptism: [employer]    Accepts blood products     Social Drivers of Health     Financial Resource Strain: Not on file   Food Insecurity: No Food Insecurity (3/1/2025)    Nursing - Inadequate Food Risk Classification     Worried About Running Out of Food in the Last Year: Not on file     Ran Out of Food in the Last Year: Not on file     Ran Out of Food in the Last Year: Never true   Transportation Needs: No Transportation Needs (3/1/2025)    Nursing - Transportation Risk Classification     Lack of Transportation: Not on file     Lack of Transportation: No   Physical Activity: Not on file   Stress: Not on file   Social Connections: Not on file   Intimate Partner Violence: Unknown (3/1/2025)    Nursing IPS     Feels Physically and Emotionally Safe: Not on file     Physically Hurt by Someone: Not on file     Humiliated or Emotionally Abused by Someone: Not on file     Physically Hurt by Someone: No     Hurt or Threatened by Someone: No   Housing Stability: Unknown (3/1/2025)    Nursing: Inadequate Housing Risk Classification     Has Housing: Not on file     Worried About Losing Housing: Not on file     Unable to Get Utilities: Not on file     Unable to Pay for Housing in the Last Year: No     Has Housin       Family History   Problem Relation Age of Onset    Hypertension Mother     Hypertension Father     Lung cancer Father     No Known Problems Sister     No Known Problems Brother     No Known Problems Brother     No Known Problems Brother     Diabetes Maternal Grandmother     Hypertension Paternal Grandmother     Heart attack Paternal Grandmother     Hypertension Paternal Grandfather     Heart attack Paternal Grandfather      "Breast cancer Neg Hx     Ovarian cancer Neg Hx     Colon cancer Neg Hx        Review of Systems   Constitutional:  Negative for chills, fatigue, fever and unexpected weight change.   HENT:  Negative for congestion, mouth sores and sore throat.    Respiratory:  Negative for cough, chest tightness, shortness of breath and wheezing.    Cardiovascular:  Negative for chest pain and palpitations.   Gastrointestinal:  Negative for abdominal distention, abdominal pain, constipation, diarrhea, nausea and vomiting.   Endocrine: Negative for cold intolerance and heat intolerance.   Genitourinary:  Positive for vaginal bleeding. Negative for dyspareunia (not active since delivery), dysuria, genital sores, menstrual problem, pelvic pain, vaginal discharge and vaginal pain.   Musculoskeletal:  Negative for arthralgias.   Skin:  Negative for color change and rash.   Neurological:  Negative for dizziness, light-headedness and headaches.   Hematological:  Negative for adenopathy.       Blood pressure 106/62, height 5' 5\" (1.651 m), weight 70.3 kg (155 lb), last menstrual period 06/08/2024, currently breastfeeding. and Body mass index is 25.79 kg/m².    Physical Exam  Constitutional:       Appearance: Normal appearance. She is well-developed.   HENT:      Head: Normocephalic and atraumatic.   Eyes:      Conjunctiva/sclera: Conjunctivae normal.   Neck:      Thyroid: No thyromegaly.      Trachea: No tracheal deviation.   Cardiovascular:      Rate and Rhythm: Normal rate and regular rhythm.      Heart sounds: Normal heart sounds.   Pulmonary:      Effort: Pulmonary effort is normal. No respiratory distress.      Breath sounds: Normal breath sounds. No stridor. No wheezing or rales.   Abdominal:      General: Bowel sounds are normal. There is no distension.      Palpations: Abdomen is soft. There is no mass.      Tenderness: There is no abdominal tenderness. There is no guarding or rebound.   Musculoskeletal:         General: No " tenderness. Normal range of motion.      Cervical back: Normal range of motion and neck supple.   Lymphadenopathy:      Cervical: No cervical adenopathy.   Skin:     General: Skin is warm.      Findings: No erythema or rash.   Neurological:      Mental Status: She is alert and oriented to person, place, and time.   Psychiatric:         Behavior: Behavior normal.         Thought Content: Thought content normal.         Judgment: Judgment normal.          vulva: normal external genitalia for age and no lesions, masses, epithelial changes, or exudate  vagina: color red and rugae  flattening of rugae  cervix: parous and no lesions   uterus: NSSC, AF, NT, mobile  adnexa: no masses or tenderness  No bleeding noted    A/P:  Pt is a 30 y.o.  with      Yvette was seen today for postpartum care.    Diagnoses and all orders for this visit:    Postpartum care and examination of lactating mother  -     norethindrone (Ortho Micronor) 0.35 MG tablet; Take 1 tablet (0.35 mg total) by mouth daily  -normal examination  -declines pelvic floor physical therapy    Oral contraceptive pill surveillance  -     norethindrone (Ortho Micronor) 0.35 MG tablet; Take 1 tablet (0.35 mg total) by mouth daily

## 2025-04-13 DIAGNOSIS — E55.9 VITAMIN D DEFICIENCY: ICD-10-CM

## 2025-04-14 RX ORDER — CHOLECALCIFEROL (VITAMIN D3) 25 MCG
2000 TABLET ORAL DAILY
Qty: 60 TABLET | Refills: 5 | Status: SHIPPED | OUTPATIENT
Start: 2025-04-14

## 2025-04-26 ENCOUNTER — OFFICE VISIT (OUTPATIENT)
Dept: URGENT CARE | Age: 30
End: 2025-04-26
Payer: COMMERCIAL

## 2025-04-26 VITALS
WEIGHT: 154 LBS | SYSTOLIC BLOOD PRESSURE: 104 MMHG | OXYGEN SATURATION: 99 % | BODY MASS INDEX: 25.63 KG/M2 | RESPIRATION RATE: 18 BRPM | HEART RATE: 84 BPM | TEMPERATURE: 98.2 F | DIASTOLIC BLOOD PRESSURE: 64 MMHG

## 2025-04-26 DIAGNOSIS — N93.9 VAGINAL BLEEDING: Primary | ICD-10-CM

## 2025-04-26 LAB
SL AMB  POCT GLUCOSE, UA: ABNORMAL
SL AMB LEUKOCYTE ESTERASE,UA: ABNORMAL
SL AMB POCT BILIRUBIN,UA: ABNORMAL
SL AMB POCT BLOOD,UA: ABNORMAL
SL AMB POCT CLARITY,UA: ABNORMAL
SL AMB POCT COLOR,UA: ABNORMAL
SL AMB POCT KETONES,UA: ABNORMAL
SL AMB POCT NITRITE,UA: ABNORMAL
SL AMB POCT PH,UA: 6
SL AMB POCT SPECIFIC GRAVITY,UA: 1.02
SL AMB POCT URINE HCG: NORMAL
SL AMB POCT URINE PROTEIN: ABNORMAL
SL AMB POCT UROBILINOGEN: 0.2

## 2025-04-26 PROCEDURE — S9083 URGENT CARE CENTER GLOBAL: HCPCS

## 2025-04-26 PROCEDURE — 81025 URINE PREGNANCY TEST: CPT

## 2025-04-26 PROCEDURE — 81002 URINALYSIS NONAUTO W/O SCOPE: CPT

## 2025-04-26 PROCEDURE — G0383 LEV 4 HOSP TYPE B ED VISIT: HCPCS

## 2025-04-26 NOTE — PROGRESS NOTES
[facility]        NAME: Yvette Mcdonald is a 30 y.o. female  : 1995    MRN: [id number]  DATE: 2025  TIME: 4:12 PM    Assessment and Plan   Vaginal bleeding [N93.9]  1. Vaginal bleeding  POCT urine dip    POCT urine HCG        VSS.  Urine HCG negative.  Urine dip positive for blood.  Discussed findings with pt. Vaginal/internal exam not done in office today.  Recommend pt have further evaluation of her vaginal bleeding in the Emergency Department.  Differential diagnosis discussed with patient including vaginal tear, postcoital bleeding, postpartum bleeding, routine menstrual cycle, oral birth control related spotting.  Pt declined at this time stating she does not have time; she may go to the ED tomorrow if bleeding continues.  With patient and her , she should go to emergency department if she is changing her pad every hour, has any dizziness, lightheadedness, increased abdominal cramping, or active bleeding around the vagina.  She should abstain from sexual intercourse until evaluated by her OB/GYN.      Patient Instructions       Follow up with PCP in 3-5 days.  Proceed to  ER if symptoms worsen.    If tests have been performed at [facility], our office will contact you with results if changes need to be made to the care plan discussed with you at the visit.  You can review your full results on [facility] MyChart.    Chief Complaint     Chief Complaint   Patient presents with   • Vaginal Bleeding     Vaginal bleeding x 4 days          History of Present Illness       Italian  # [id number] utilized during encounter.  Patient is a 30-year-old female 50 days postpartum vaginal delivery presenting with vaginal bleeding for 3 days.  She reports having vaginal intercourse 4 days ago.  She states the bleeding initially started with some spotting, and now has progressed vaginal bleeding of 1 pad every 2 hours, she reports generalized fatigue.  She reports mild abdominal cramping.  She  is unsure if she has had menstrual period since the delivery.  She reports taking Ortho Micronor as birth control.  She reports calling her GYN, and has an appointment for 5 days from now. She reports breast feeding and bottle feeding.     Vaginal Bleeding  The patient's pertinent negatives include no pelvic pain or vaginal discharge. Pertinent negatives include no chills, dysuria, fever, flank pain, frequency, headaches, hematuria or urgency.       Review of Systems   Review of Systems   Constitutional:  Positive for fatigue. Negative for chills and fever.   Genitourinary:  Positive for vaginal bleeding. Negative for decreased urine volume, difficulty urinating, dyspareunia, dysuria, enuresis, flank pain, frequency, genital sores, hematuria, menstrual problem, pelvic pain, urgency, vaginal discharge and vaginal pain.   Neurological:  Positive for weakness. Negative for dizziness, numbness and headaches.         Current Medications       Current Outpatient Medications:   •  cholecalciferol (VITAMIN D3) 1,000 units tablet, TAKE 2 TABLETS (2,000 UNITS TOTAL) BY MOUTH DAILY, Disp: 60 tablet, Rfl: 5  •  norethindrone (Ortho Micronor) 0.35 MG tablet, Take 1 tablet (0.35 mg total) by mouth daily, Disp: 84 tablet, Rfl: 1  •  Prenatal Vit-Fe Fumarate-FA (PRENATAL VITAMIN PO), Take by mouth, Disp: , Rfl:     Current Allergies     Allergies as of 04/26/2025   • (No Known Allergies)            The following portions of the patient's history were reviewed and updated as appropriate: allergies, current medications, past family history, past medical history, past social history, past surgical history and problem list.     Past Medical History:   Diagnosis Date   • Chickenpox    • Genetic screening 08/2024    HGB AA   • Rubella non-immune status, antepartum 03/01/2025    Got vaccine post partum         Past Surgical History:   Procedure Laterality Date   • NASAL RECONSTRUCTION      cosmetic       Family History   Problem Relation  Age of Onset   • Hypertension Mother    • Hypertension Father    • Lung cancer Father    • No Known Problems Sister    • No Known Problems Brother    • No Known Problems Brother    • No Known Problems Brother    • Diabetes Maternal Grandmother    • Hypertension Paternal Grandmother    • Heart attack Paternal Grandmother    • Hypertension Paternal Grandfather    • Heart attack Paternal Grandfather    • Breast cancer Neg Hx    • Ovarian cancer Neg Hx    • Colon cancer Neg Hx          Medications have been verified.        Objective   /64 (BP Location: Left arm, Patient Position: Sitting, Cuff Size: Adult)   Pulse 84   Temp 98.2 °F (36.8 °C) (Tympanic)   Resp 18   Wt 69.9 kg (154 lb)   LMP 06/08/2024 (Exact Date)   SpO2 99%   BMI 25.63 kg/m²   Patient's last menstrual period was 06/08/2024 (exact date).       Physical Exam     Physical Exam  Vitals and nursing note reviewed.   Constitutional:       General: She is not in acute distress.     Appearance: Normal appearance. She is normal weight. She is not ill-appearing.   Cardiovascular:      Rate and Rhythm: Normal rate and regular rhythm.      Pulses: Normal pulses.      Heart sounds: Normal heart sounds.   Pulmonary:      Effort: Pulmonary effort is normal.      Breath sounds: Normal breath sounds.   Abdominal:      General: Abdomen is flat. Bowel sounds are normal. There is no distension.      Palpations: Abdomen is soft. There is no mass.      Tenderness: There is no abdominal tenderness. There is no right CVA tenderness, left CVA tenderness, guarding or rebound.      Hernia: No hernia is present.   Skin:     Capillary Refill: Capillary refill takes less than 2 seconds.   Neurological:      General: No focal deficit present.      Mental Status: She is alert and oriented to person, place, and time.

## 2025-05-01 ENCOUNTER — OFFICE VISIT (OUTPATIENT)
Dept: OBGYN CLINIC | Facility: CLINIC | Age: 30
End: 2025-05-01
Payer: COMMERCIAL

## 2025-05-01 VITALS
BODY MASS INDEX: 26.16 KG/M2 | WEIGHT: 157 LBS | DIASTOLIC BLOOD PRESSURE: 54 MMHG | HEIGHT: 65 IN | SYSTOLIC BLOOD PRESSURE: 92 MMHG

## 2025-05-01 DIAGNOSIS — N93.9 ABNORMAL UTERINE BLEEDING (AUB): Primary | ICD-10-CM

## 2025-05-01 DIAGNOSIS — Z30.41 ORAL CONTRACEPTIVE PILL SURVEILLANCE: ICD-10-CM

## 2025-05-01 DIAGNOSIS — E28.2 PCOS (POLYCYSTIC OVARIAN SYNDROME): ICD-10-CM

## 2025-05-01 PROCEDURE — 99214 OFFICE O/P EST MOD 30 MIN: CPT | Performed by: OBSTETRICS & GYNECOLOGY

## 2025-05-01 PROCEDURE — 99459 PELVIC EXAMINATION: CPT | Performed by: OBSTETRICS & GYNECOLOGY

## 2025-05-01 RX ORDER — DROSPIRENONE AND ETHINYL ESTRADIOL 0.02-3(28)
1 KIT ORAL DAILY
Qty: 28 TABLET | Refills: 2 | Status: SHIPPED | OUTPATIENT
Start: 2025-05-01

## 2025-05-01 NOTE — PROGRESS NOTES
Patient is a 30 y.o.  with No LMP recorded (lmp unknown). who presents requesting evaluation of AUB/PCB.    She reports she delivered on 3/1/2025. She had intercourse at 6 weeks postpartum. She reports she had spotting the next day and then she had 7 days of bleeding that was bright red. She reports that she was filling an overnight pad every 3-4 hours for the first 3 days and then every 4-5 hours.  She reports she had intercourse 8 days later and then she had spotting the next day and then she had 4 more days of bleeding like a period requiring an overnight pad every 3-4 hours. She did use KY jelly both times and reports she had bleeding in pregnancy when she used KY jelly. Pt advised this in unlikely to be the source, but she can avoid it and try another brand if she likes    Pt is taking a POP and has not missed any pills. She has stopped breast feeding and would like to return to a [facility]. She will finish her pack this weekend. Rx sent to pharmacy.     Will check pelvic ultrasound and labs.     Past Medical History:   Diagnosis Date    Chickenpox     Genetic screening 2024    HGB AA    Rubella non-immune status, antepartum 2025    Got vaccine post partum         Past Surgical History:   Procedure Laterality Date    NASAL RECONSTRUCTION      cosmetic       OB History    Para Term  AB Living   1 1 1 0 0 1   SAB IAB Ectopic Multiple Live Births   0 0 0 0 1      # Outcome Date GA Lbr Dante/2nd Weight Sex Type Anes PTL Lv   1 Term 25 38w0d / 00:31 3120 g (6 lb 14.1 oz) F Vag-Spont EPI N PARAG      Obstetric Comments   Menarche: [age]      Menses: 28-32/5-7/maxi pad  every 6 hours x 2 days, then regular pad every 8-12 hours         Current Outpatient Medications:     cholecalciferol (VITAMIN D3) 1,000 units tablet, TAKE 2 TABLETS (2,000 UNITS TOTAL) BY MOUTH DAILY, Disp: 60 tablet, Rfl: 5    drospirenone-ethinyl estradiol (JOJO) 3-0.02 MG per tablet, Take 1 tablet by mouth daily, Disp: 28  tablet, Rfl: 2    Omega-3 Fatty Acids (OMEGA-3 CF PO), Take by mouth, Disp: , Rfl:     Prenatal Vit-Fe Fumarate-FA (PRENATAL VITAMIN PO), Take by mouth (Patient not taking: Reported on 5/1/2025), Disp: , Rfl:     No Known Allergies    Social History     Socioeconomic History    Marital status: /Civil Union     Spouse name: Alfredo    Number of children: 0    Years of education: None    Highest education level: Bachelor's degree (e.g., BA, AB, BS)   Occupational History    Occupation:    Tobacco Use    Smoking status: Never    Smokeless tobacco: Never   Vaping Use    Vaping status: Former    Substances: Nicotine, Flavoring   Substance and Sexual Activity    Alcohol use: Not Currently    Drug use: Never    Sexual activity: Yes     Partners: Male     Birth control/protection: OCP     Comment: lifetime partners: 1   Other Topics Concern    None   Social History Narrative    Holiness: Chinese [employer]    Accepts blood products     Social Drivers of Health     Financial Resource Strain: Not on file   Food Insecurity: No Food Insecurity (3/1/2025)    Nursing - Inadequate Food Risk Classification     Worried About Running Out of Food in the Last Year: Not on file     Ran Out of Food in the Last Year: Not on file     Ran Out of Food in the Last Year: Never true   Transportation Needs: No Transportation Needs (3/1/2025)    Nursing - Transportation Risk Classification     Lack of Transportation: Not on file     Lack of Transportation: No   Physical Activity: Not on file   Stress: Not on file   Social Connections: Not on file   Intimate Partner Violence: Unknown (3/1/2025)    Nursing IPS     Feels Physically and Emotionally Safe: Not on file     Physically Hurt by Someone: Not on file     Humiliated or Emotionally Abused by Someone: Not on file     Physically Hurt by Someone: No     Hurt or Threatened by Someone: No   Housing Stability: Unknown (3/1/2025)    Nursing: Inadequate Housing Risk Classification     Has  "Housing: Not on file     Worried About Losing Housing: Not on file     Unable to Get Utilities: Not on file     Unable to Pay for Housing in the Last Year: No     Has Housin       Family History   Problem Relation Age of Onset    Hypertension Mother     Hypertension Father     Lung cancer Father     No Known Problems Sister     No Known Problems Brother     No Known Problems Brother     No Known Problems Brother     Diabetes Maternal Grandmother     Hypertension Paternal Grandmother     Heart attack Paternal Grandmother     Hypertension Paternal Grandfather     Heart attack Paternal Grandfather     Breast cancer Neg Hx     Ovarian cancer Neg Hx     Colon cancer Neg Hx        Review of Systems   Constitutional:  Negative for chills, fatigue, fever and unexpected weight change.   HENT:  Negative for congestion, mouth sores and sore throat.    Respiratory:  Negative for cough, chest tightness, shortness of breath and wheezing.    Cardiovascular:  Negative for chest pain and palpitations.   Gastrointestinal:  Negative for abdominal distention, abdominal pain, constipation, diarrhea, nausea and vomiting.   Endocrine: Negative for cold intolerance and heat intolerance.   Genitourinary:  Positive for menstrual problem. Negative for dyspareunia, dysuria, genital sores, pelvic pain, vaginal bleeding, vaginal discharge and vaginal pain.        Post coital bleeding   Musculoskeletal:  Negative for arthralgias.   Skin:  Negative for color change and rash.   Neurological:  Negative for dizziness, light-headedness and headaches.   Hematological:  Negative for adenopathy.       Blood pressure 92/54, height 5' 5\" (1.651 m), weight 71.2 kg (157 lb), currently breastfeeding. and Body mass index is 26.13 kg/m².    Physical Exam  Constitutional:       General: She is not in acute distress.     Appearance: Normal appearance. She is normal weight. She is not ill-appearing.   HENT:      Head: Normocephalic and atraumatic.   Eyes:      " Extraocular Movements: Extraocular movements intact.      Conjunctiva/sclera: Conjunctivae normal.   Pulmonary:      Effort: Pulmonary effort is normal.   Abdominal:      General: Abdomen is flat. There is no distension.      Palpations: Abdomen is soft. There is no mass.      Tenderness: There is no abdominal tenderness. There is no guarding or rebound.      Hernia: No hernia is present.   Musculoskeletal:         General: Normal range of motion.      Cervical back: Normal range of motion.      Right lower leg: No edema.      Left lower leg: No edema.   Skin:     General: Skin is warm.      Findings: No erythema or rash.   Neurological:      Mental Status: She is alert and oriented to person, place, and time.   Psychiatric:         Mood and Affect: Mood normal.         Behavior: Behavior normal.         Thought Content: Thought content normal.         Judgment: Judgment normal.          vulva: normal external genitalia for age and no lesions, masses, epithelial changes, or exudate  vagina: color pink and rugae  well formed rugae  cervix: parous and no lesions   uterus: NSSC, AF, NT, mobile  adnexa: no masses or tenderness      A/P:  Pt is a 30 y.o.  with      Yvette was seen today for concern.    Diagnoses and all orders for this visit:    Abnormal uterine bleeding (AUB)  -     CBC; Future  -     Prolactin; Future  -     TSH, 3rd generation with Free T4 reflex; Future  -     US pelvis complete w transvaginal; Future    PCOS (polycystic ovarian syndrome)  -     drospirenone-ethinyl estradiol (JOJO) 3-0.02 MG per tablet; Take 1 tablet by mouth daily    Oral contraceptive pill surveillance  -     drospirenone-ethinyl estradiol (JOJO) 3-0.02 MG per tablet; Take 1 tablet by mouth daily

## 2025-05-02 ENCOUNTER — APPOINTMENT (OUTPATIENT)
Dept: LAB | Age: 30
End: 2025-05-02
Payer: COMMERCIAL

## 2025-05-02 DIAGNOSIS — N93.9 ABNORMAL UTERINE BLEEDING (AUB): ICD-10-CM

## 2025-05-02 LAB
ERYTHROCYTE [DISTWIDTH] IN BLOOD BY AUTOMATED COUNT: 12.4 % (ref 11.6–15.1)
HCT VFR BLD AUTO: 36.2 % (ref 34.8–46.1)
HGB BLD-MCNC: 11.8 G/DL (ref 11.5–15.4)
MCH RBC QN AUTO: 28.6 PG (ref 26.8–34.3)
MCHC RBC AUTO-ENTMCNC: 32.6 G/DL (ref 31.4–37.4)
MCV RBC AUTO: 88 FL (ref 82–98)
PLATELET # BLD AUTO: 285 THOUSANDS/UL (ref 149–390)
PMV BLD AUTO: 12.3 FL (ref 8.9–12.7)
PROLACTIN SERPL-MCNC: 10.46 NG/ML (ref 3.34–26.72)
RBC # BLD AUTO: 4.12 MILLION/UL (ref 3.81–5.12)
TSH SERPL DL<=0.05 MIU/L-ACNC: 1.71 UIU/ML (ref 0.45–4.5)
WBC # BLD AUTO: 7.89 THOUSAND/UL (ref 4.31–10.16)

## 2025-05-02 PROCEDURE — 36415 COLL VENOUS BLD VENIPUNCTURE: CPT

## 2025-05-02 PROCEDURE — 85027 COMPLETE CBC AUTOMATED: CPT

## 2025-05-02 PROCEDURE — 84443 ASSAY THYROID STIM HORMONE: CPT

## 2025-05-02 PROCEDURE — 84146 ASSAY OF PROLACTIN: CPT

## 2025-05-05 ENCOUNTER — RESULTS FOLLOW-UP (OUTPATIENT)
Age: 30
End: 2025-05-05

## 2025-05-07 ENCOUNTER — HOSPITAL ENCOUNTER (OUTPATIENT)
Dept: RADIOLOGY | Facility: HOSPITAL | Age: 30
Discharge: HOME | End: 2025-05-07
Attending: OBSTETRICS & GYNECOLOGY
Payer: COMMERCIAL

## 2025-05-07 DIAGNOSIS — N93.9 ABNORMAL UTERINE BLEEDING (AUB): ICD-10-CM

## 2025-05-07 PROCEDURE — 76830 TRANSVAGINAL US NON-OB: CPT

## 2025-05-07 PROCEDURE — 76856 US EXAM PELVIC COMPLETE: CPT

## 2025-05-22 DIAGNOSIS — E28.2 PCOS (POLYCYSTIC OVARIAN SYNDROME): ICD-10-CM

## 2025-05-22 DIAGNOSIS — Z30.41 ORAL CONTRACEPTIVE PILL SURVEILLANCE: ICD-10-CM

## 2025-05-22 RX ORDER — DROSPIRENONE AND ETHINYL ESTRADIOL 0.02-3(28)
1 KIT ORAL DAILY
Qty: 84 TABLET | Refills: 1 | Status: SHIPPED | OUTPATIENT
Start: 2025-05-22

## 2025-07-22 ENCOUNTER — APPOINTMENT (OUTPATIENT)
Dept: LAB | Age: 30
End: 2025-07-22
Payer: COMMERCIAL

## 2025-08-20 ENCOUNTER — ANNUAL EXAM (OUTPATIENT)
Age: 30
End: 2025-08-20
Payer: COMMERCIAL

## 2025-08-20 VITALS
SYSTOLIC BLOOD PRESSURE: 112 MMHG | HEIGHT: 65 IN | BODY MASS INDEX: 24.99 KG/M2 | DIASTOLIC BLOOD PRESSURE: 64 MMHG | WEIGHT: 150 LBS

## 2025-08-20 DIAGNOSIS — Z01.419 ENCOUNTER FOR ANNUAL ROUTINE GYNECOLOGICAL EXAMINATION: Primary | ICD-10-CM

## 2025-08-20 DIAGNOSIS — Z30.41 ORAL CONTRACEPTIVE PILL SURVEILLANCE: ICD-10-CM

## 2025-08-20 DIAGNOSIS — E58 DIETARY CALCIUM DEFICIENCY: ICD-10-CM

## 2025-08-20 DIAGNOSIS — E28.2 PCOS (POLYCYSTIC OVARIAN SYNDROME): ICD-10-CM

## 2025-08-20 DIAGNOSIS — Z72.3 INADEQUATE EXERCISE: ICD-10-CM

## 2025-08-20 PROBLEM — O99.013 ANEMIA OF MOTHER IN PREGNANCY, ANTEPARTUM, THIRD TRIMESTER: Status: RESOLVED | Noted: 2024-08-27 | Resolved: 2025-08-20

## 2025-08-20 PROBLEM — Z37.9 NORMAL LABOR: Status: RESOLVED | Noted: 2025-03-01 | Resolved: 2025-08-20

## 2025-08-20 PROCEDURE — S0612 ANNUAL GYNECOLOGICAL EXAMINA: HCPCS | Performed by: OBSTETRICS & GYNECOLOGY

## 2025-08-20 RX ORDER — DROSPIRENONE AND ETHINYL ESTRADIOL 0.02-3(28)
1 KIT ORAL DAILY
Qty: 84 TABLET | Refills: 4 | Status: SHIPPED | OUTPATIENT
Start: 2025-08-20